# Patient Record
Sex: FEMALE | Race: WHITE | NOT HISPANIC OR LATINO | Employment: PART TIME | URBAN - METROPOLITAN AREA
[De-identification: names, ages, dates, MRNs, and addresses within clinical notes are randomized per-mention and may not be internally consistent; named-entity substitution may affect disease eponyms.]

---

## 2017-03-27 ENCOUNTER — GENERIC CONVERSION - ENCOUNTER (OUTPATIENT)
Dept: OTHER | Facility: OTHER | Age: 27
End: 2017-03-27

## 2017-03-27 DIAGNOSIS — Z86.14 HISTORY OF METHICILLIN RESISTANT STAPHYLOCOCCUS AUREUS INFECTION: ICD-10-CM

## 2017-03-28 ENCOUNTER — GENERIC CONVERSION - ENCOUNTER (OUTPATIENT)
Dept: OTHER | Facility: OTHER | Age: 27
End: 2017-03-28

## 2017-04-06 ENCOUNTER — GENERIC CONVERSION - ENCOUNTER (OUTPATIENT)
Dept: OTHER | Facility: OTHER | Age: 27
End: 2017-04-06

## 2017-08-21 ENCOUNTER — HOSPITAL ENCOUNTER (EMERGENCY)
Facility: HOSPITAL | Age: 27
Discharge: HOME | End: 2017-08-21
Attending: EMERGENCY MEDICINE
Payer: COMMERCIAL

## 2017-08-21 VITALS
DIASTOLIC BLOOD PRESSURE: 75 MMHG | OXYGEN SATURATION: 100 % | RESPIRATION RATE: 18 BRPM | BODY MASS INDEX: 22.35 KG/M2 | TEMPERATURE: 98.2 F | HEART RATE: 108 BPM | HEIGHT: 58 IN | WEIGHT: 106.48 LBS | SYSTOLIC BLOOD PRESSURE: 118 MMHG

## 2017-08-21 DIAGNOSIS — J02.9 ACUTE VIRAL PHARYNGITIS: Primary | ICD-10-CM

## 2017-08-21 PROCEDURE — 99282 EMERGENCY DEPT VISIT SF MDM: CPT

## 2017-08-21 RX ORDER — QUETIAPINE FUMARATE 50 MG/1
200 TABLET, FILM COATED ORAL
COMMUNITY
End: 2018-02-27

## 2017-08-21 RX ORDER — QUETIAPINE FUMARATE 50 MG/1
50 TABLET, FILM COATED ORAL
COMMUNITY
End: 2018-02-27
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
## 2018-01-16 NOTE — RESULT NOTES
Message  pt  scheduled for pre-demi H&P thru [facility]  Pt  refusing to have recommended services and only wants to have dating [address] she is going to be discharged from half-way and will be terminating pregnancy at [facility] later this week  Appointment cancelled  Patient encouraged to have f/u appointment for birth control options after discharge        Signatures   Electronically signed by : Maggie Chan RN; 2017 11:22AM EST                       (Author)

## 2018-01-22 VITALS
DIASTOLIC BLOOD PRESSURE: 74 MMHG | WEIGHT: 110.8 LBS | SYSTOLIC BLOOD PRESSURE: 110 MMHG | BODY MASS INDEX: 23.26 KG/M2 | HEIGHT: 58 IN

## 2018-02-27 ENCOUNTER — INITIAL PRENATAL (OUTPATIENT)
Dept: OBGYN CLINIC | Facility: CLINIC | Age: 28
End: 2018-02-27

## 2018-02-27 VITALS
DIASTOLIC BLOOD PRESSURE: 58 MMHG | WEIGHT: 96 LBS | SYSTOLIC BLOOD PRESSURE: 120 MMHG | HEIGHT: 59 IN | BODY MASS INDEX: 19.35 KG/M2

## 2018-02-27 DIAGNOSIS — Z36.9 ANTENATAL SCREENING ENCOUNTER: ICD-10-CM

## 2018-02-27 DIAGNOSIS — Z34.81 PRENATAL CARE, SUBSEQUENT PREGNANCY, FIRST TRIMESTER: Primary | ICD-10-CM

## 2018-02-27 PROCEDURE — PNV: Performed by: OBSTETRICS & GYNECOLOGY

## 2018-02-27 NOTE — PROGRESS NOTES
Initial prenatal appt:   SAB1 VIP2 - FOB involved (1st preg for him) - pt hx - ADD, bipolar (d/c Seroquel 2017)  Pt d/c depo provera 2017 - states menses have been q 2-3 wks since  Hx heroin use - not x past 16 months, smoker - was smoking 1 ppd, now 1/4 ppd since pregnant  Hx MRSA  Taking prenatal vitamins - will add DHA  Reviewed nutrition (needs to increase calcium), SQS, CF testing, TDAP @ 28 wks,  Initial prenatal labs ordered ([facility])  Undecided about breastfeeding @ present      - pt wears dentures

## 2018-03-02 ENCOUNTER — ROUTINE PRENATAL (OUTPATIENT)
Dept: OBGYN CLINIC | Facility: CLINIC | Age: 28
End: 2018-03-02
Payer: COMMERCIAL

## 2018-03-02 VITALS — BODY MASS INDEX: 19.39 KG/M2 | DIASTOLIC BLOOD PRESSURE: 70 MMHG | SYSTOLIC BLOOD PRESSURE: 114 MMHG | WEIGHT: 96 LBS

## 2018-03-02 DIAGNOSIS — Z34.81 PRENATAL CARE, SUBSEQUENT PREGNANCY, FIRST TRIMESTER: Primary | ICD-10-CM

## 2018-03-02 DIAGNOSIS — Z36.9 ANTENATAL SCREENING ENCOUNTER: ICD-10-CM

## 2018-03-02 DIAGNOSIS — O21.9 NAUSEA AND VOMITING DURING PREGNANCY: ICD-10-CM

## 2018-03-02 PROCEDURE — 87491 CHLMYD TRACH DNA AMP PROBE: CPT | Performed by: OBSTETRICS & GYNECOLOGY

## 2018-03-02 PROCEDURE — 87591 N.GONORRHOEAE DNA AMP PROB: CPT | Performed by: OBSTETRICS & GYNECOLOGY

## 2018-03-02 PROCEDURE — G0145 SCR C/V CYTO,THINLAYER,RESCR: HCPCS | Performed by: PATHOLOGY

## 2018-03-02 PROCEDURE — 99213 OFFICE O/P EST LOW 20 MIN: CPT | Performed by: OBSTETRICS & GYNECOLOGY

## 2018-03-02 PROCEDURE — 87624 HPV HI-RISK TYP POOLED RSLT: CPT | Performed by: OBSTETRICS & GYNECOLOGY

## 2018-03-02 PROCEDURE — 87070 CULTURE OTHR SPECIMN AEROBIC: CPT | Performed by: OBSTETRICS & GYNECOLOGY

## 2018-03-02 PROCEDURE — G0124 SCREEN C/V THIN LAYER BY MD: HCPCS | Performed by: PATHOLOGY

## 2018-03-02 RX ORDER — ONDANSETRON 4 MG/1
4 TABLET, FILM COATED ORAL EVERY 8 HOURS PRN
Qty: 20 TABLET | Refills: 0 | Status: SHIPPED | OUTPATIENT
Start: 2018-03-02 | End: 2018-06-16 | Stop reason: ALTCHOICE

## 2018-03-02 NOTE — PROGRESS NOTES
Transabdominal US confirms viable intrauterine pregnancy measuring 11 4/7 weeks  Will keep WILLAM of 18 from LMP  Prenatal course of care reviewed with patient  Patient advised to verify with her insurance regarding coverage of sequential screening and to schedule appt with  center within the next  She will have her initial OB labs drawn on 18  Consent for treatment signed  Pap and cultures obtained  All questions and concerns addressed  She is to return in 4 weeks

## 2018-03-04 LAB
BACTERIA GENITAL AEROBE CULT: NORMAL
CHLAMYDIA DNA CVX QL NAA+PROBE: NORMAL
N GONORRHOEA DNA GENITAL QL NAA+PROBE: NORMAL

## 2018-03-05 ENCOUNTER — TELEPHONE (OUTPATIENT)
Dept: OBGYN CLINIC | Facility: CLINIC | Age: 28
End: 2018-03-05

## 2018-03-05 NOTE — TELEPHONE ENCOUNTER
Pt noticed red spotting in toilet this am x 1, none since, no cramping,  Had intercourse last night  Will recall in aft to update

## 2018-03-16 LAB — HPV RRNA GENITAL QL NAA+PROBE: NORMAL

## 2018-03-22 LAB
LAB AP GYN PRIMARY INTERPRETATION: NORMAL
LAB AP LMP: NORMAL
Lab: NORMAL
PATH INTERP SPEC-IMP: NORMAL

## 2018-04-13 ENCOUNTER — TELEPHONE (OUTPATIENT)
Dept: OBGYN CLINIC | Facility: CLINIC | Age: 28
End: 2018-04-13

## 2018-04-19 ENCOUNTER — TELEPHONE (OUTPATIENT)
Dept: OBGYN CLINIC | Facility: CLINIC | Age: 28
End: 2018-04-19

## 2018-04-19 ENCOUNTER — ROUTINE PRENATAL (OUTPATIENT)
Dept: OBGYN CLINIC | Facility: CLINIC | Age: 28
End: 2018-04-19

## 2018-04-19 VITALS — SYSTOLIC BLOOD PRESSURE: 122 MMHG | BODY MASS INDEX: 20.2 KG/M2 | WEIGHT: 100 LBS | DIASTOLIC BLOOD PRESSURE: 80 MMHG

## 2018-04-19 DIAGNOSIS — Z34.82 PRENATAL CARE, SUBSEQUENT PREGNANCY, SECOND TRIMESTER: Primary | ICD-10-CM

## 2018-04-19 PROCEDURE — PNV: Performed by: NURSE PRACTITIONER

## 2018-04-19 RX ORDER — AZITHROMYCIN 250 MG/1
TABLET, FILM COATED ORAL
Refills: 0 | COMMUNITY
Start: 2018-04-16 | End: 2018-06-16 | Stop reason: ALTCHOICE

## 2018-04-19 RX ORDER — METHYLPREDNISOLONE 4 MG/1
TABLET ORAL
Refills: 0 | COMMUNITY
Start: 2018-04-16 | End: 2018-06-16 | Stop reason: ALTCHOICE

## 2018-04-19 RX ORDER — LORATADINE 10 MG/1
10 TABLET ORAL DAILY
Refills: 0 | COMMUNITY
Start: 2018-04-16 | End: 2018-06-16 | Stop reason: ALTCHOICE

## 2018-04-19 NOTE — PROGRESS NOTES
Patient is doing well  Denies LOF/Bleeding/Cramping  FHR obtained by Us  Patient is going to have labs drawn today  Quad Screening was ordered  She is still smoking 3 cigarettes/day  Currently has acute bronchitis  She was reminded to schedule her level 2 ultrasound

## 2018-05-15 ENCOUNTER — TRANSCRIBE ORDERS (OUTPATIENT)
Dept: LAB | Facility: HOSPITAL | Age: 28
End: 2018-05-15

## 2018-05-15 ENCOUNTER — TELEPHONE (OUTPATIENT)
Dept: OBGYN CLINIC | Facility: CLINIC | Age: 28
End: 2018-05-15

## 2018-05-15 ENCOUNTER — ROUTINE PRENATAL (OUTPATIENT)
Dept: OBGYN CLINIC | Facility: CLINIC | Age: 28
End: 2018-05-15
Payer: COMMERCIAL

## 2018-05-15 ENCOUNTER — APPOINTMENT (OUTPATIENT)
Dept: LAB | Facility: HOSPITAL | Age: 28
End: 2018-05-15
Attending: OBSTETRICS & GYNECOLOGY
Payer: COMMERCIAL

## 2018-05-15 VITALS — BODY MASS INDEX: 20.64 KG/M2 | DIASTOLIC BLOOD PRESSURE: 64 MMHG | WEIGHT: 102.2 LBS | SYSTOLIC BLOOD PRESSURE: 100 MMHG

## 2018-05-15 DIAGNOSIS — Z36.9 ANTENATAL SCREENING ENCOUNTER: ICD-10-CM

## 2018-05-15 DIAGNOSIS — F11.10 HEROIN ABUSE AFFECTING PREGNANCY IN SECOND TRIMESTER (HCC): ICD-10-CM

## 2018-05-15 DIAGNOSIS — Z34.82 PRENATAL CARE, SUBSEQUENT PREGNANCY, SECOND TRIMESTER: Primary | ICD-10-CM

## 2018-05-15 DIAGNOSIS — O99.322 HEROIN ABUSE AFFECTING PREGNANCY IN SECOND TRIMESTER (HCC): ICD-10-CM

## 2018-05-15 PROBLEM — Z87.820 HISTORY OF CONCUSSION: Status: ACTIVE | Noted: 2017-11-06

## 2018-05-15 PROBLEM — F32.A ANXIETY AND DEPRESSION: Status: ACTIVE | Noted: 2018-05-15

## 2018-05-15 PROBLEM — F31.30 BIPOLAR AFFECT, DEPRESSED (HCC): Status: ACTIVE | Noted: 2018-05-15

## 2018-05-15 PROBLEM — F41.9 ANXIETY AND DEPRESSION: Status: ACTIVE | Noted: 2018-05-15

## 2018-05-15 PROBLEM — F19.90 IVDU (INTRAVENOUS DRUG USER): Status: ACTIVE | Noted: 2017-10-17

## 2018-05-15 PROBLEM — Z72.0 TOBACCO ABUSE: Status: ACTIVE | Noted: 2018-05-15

## 2018-05-15 PROBLEM — J45.909 ASTHMA: Status: ACTIVE | Noted: 2017-03-27

## 2018-05-15 PROBLEM — J45.990 ASTHMA, EXERCISE INDUCED: Status: ACTIVE | Noted: 2018-05-15

## 2018-05-15 LAB
ABO GROUP BLD: NORMAL
BACTERIA UR QL AUTO: ABNORMAL /HPF
BASOPHILS # BLD AUTO: 0.03 THOUSANDS/ΜL (ref 0–0.1)
BASOPHILS NFR BLD AUTO: 0 % (ref 0–1)
BILIRUB UR QL STRIP: NEGATIVE
BLD GP AB SCN SERPL QL: NEGATIVE
CLARITY UR: CLEAR
COLOR UR: ABNORMAL
EOSINOPHIL # BLD AUTO: 0.72 THOUSAND/ΜL (ref 0–0.61)
EOSINOPHIL NFR BLD AUTO: 6 % (ref 0–6)
ERYTHROCYTE [DISTWIDTH] IN BLOOD BY AUTOMATED COUNT: 13.1 % (ref 11.6–15.1)
EXTERNAL HIV-1 ANTIBODY: NORMAL
GLUCOSE UR STRIP-MCNC: NEGATIVE MG/DL
HCT VFR BLD AUTO: 35.2 % (ref 34.8–46.1)
HGB BLD-MCNC: 11.5 G/DL (ref 11.5–15.4)
HGB UR QL STRIP.AUTO: NEGATIVE
HYALINE CASTS #/AREA URNS LPF: ABNORMAL /LPF
IMM GRANULOCYTES # BLD AUTO: 0.05 THOUSAND/UL (ref 0–0.2)
IMM GRANULOCYTES NFR BLD AUTO: 0 % (ref 0–2)
KETONES UR STRIP-MCNC: NEGATIVE MG/DL
LEUKOCYTE ESTERASE UR QL STRIP: ABNORMAL
LYMPHOCYTES # BLD AUTO: 2.65 THOUSANDS/ΜL (ref 0.6–4.47)
LYMPHOCYTES NFR BLD AUTO: 23 % (ref 14–44)
MCH RBC QN AUTO: 29.6 PG (ref 26.8–34.3)
MCHC RBC AUTO-ENTMCNC: 32.7 G/DL (ref 31.4–37.4)
MCV RBC AUTO: 91 FL (ref 82–98)
MONOCYTES # BLD AUTO: 0.66 THOUSAND/ΜL (ref 0.17–1.22)
MONOCYTES NFR BLD AUTO: 6 % (ref 4–12)
NEUTROPHILS # BLD AUTO: 7.41 THOUSANDS/ΜL (ref 1.85–7.62)
NEUTS SEG NFR BLD AUTO: 64 % (ref 43–75)
NITRITE UR QL STRIP: NEGATIVE
NON-SQ EPI CELLS URNS QL MICRO: ABNORMAL /HPF
NRBC BLD AUTO-RTO: 0 /100 WBCS
PH UR STRIP.AUTO: 8.5 [PH] (ref 4.5–8)
PLATELET # BLD AUTO: 296 THOUSANDS/UL (ref 149–390)
PMV BLD AUTO: 10.9 FL (ref 8.9–12.7)
PROT UR STRIP-MCNC: ABNORMAL MG/DL
RBC # BLD AUTO: 3.89 MILLION/UL (ref 3.81–5.12)
RBC #/AREA URNS AUTO: ABNORMAL /HPF
RH BLD: POSITIVE
RUBV IGG SERPL IA-ACNC: 11.9 IU/ML
SP GR UR STRIP.AUTO: 1.02 (ref 1–1.03)
SPECIMEN EXPIRATION DATE: NORMAL
UROBILINOGEN UR QL STRIP.AUTO: 1 E.U./DL
WBC # BLD AUTO: 11.52 THOUSAND/UL (ref 4.31–10.16)
WBC #/AREA URNS AUTO: ABNORMAL /HPF

## 2018-05-15 PROCEDURE — 87147 CULTURE TYPE IMMUNOLOGIC: CPT

## 2018-05-15 PROCEDURE — 36415 COLL VENOUS BLD VENIPUNCTURE: CPT

## 2018-05-15 PROCEDURE — 99213 OFFICE O/P EST LOW 20 MIN: CPT | Performed by: OBSTETRICS & GYNECOLOGY

## 2018-05-15 PROCEDURE — 87086 URINE CULTURE/COLONY COUNT: CPT

## 2018-05-15 PROCEDURE — 81001 URINALYSIS AUTO W/SCOPE: CPT

## 2018-05-15 PROCEDURE — 80081 OBSTETRIC PANEL INC HIV TSTG: CPT

## 2018-05-15 NOTE — PROGRESS NOTES
Denies LOF/Bleeding/Cramping  Baby is moving well  She hasn't had her initial labs drawn and has not scheduled her level II ultrasound  She is accompanied by her mother today  Patient is very tearful, states she relapsed on heroin and has been using twice a day for the past 2 months  States she wants help and needs guidance to an outpatient clinic that can place her Subutex  We discussed with the patient transferring her care to the [address] [place] [address] as they have access to a  on site who can assist her in getting into an outpatient program  Our manager will coordinate her transfer, we will give her call by tomorrow with an update  In the meantime she has been encouraged to get her labs drawn and schedule her level II US with in the  center  Patient's mother will ensure that patient has labs drawn today and schedules her level II US

## 2018-05-16 ENCOUNTER — TELEPHONE (OUTPATIENT)
Dept: OBGYN CLINIC | Facility: CLINIC | Age: 28
End: 2018-05-16

## 2018-05-16 LAB
BACTERIA UR CULT: ABNORMAL
BACTERIA UR CULT: ABNORMAL
HBV SURFACE AG SER QL: NORMAL
HIV 1+2 AB+HIV1 P24 AG SERPL QL IA: NORMAL
RPR SER QL: NORMAL

## 2018-05-16 NOTE — TELEPHONE ENCOUNTER
Spoke with Tomasa Grey patient's mother regarding treatment options for patient  I have located 2 facilities that will take patient and start her on methadone  Patient does not want to start on methadone as she has been on this treatment in the past and does not like the way it makes her feel  She prefers to be placed on Subutex or Suboxone  Explained to mother in order for patient to get treatment with Subutex or Suboxone she would need to transfer her care to either Dr Juan Stover or Dr Myriam Schwartz of [facility]  Mother provided with name and numbers for each location and provider  She will keep us up to date as to their decision

## 2018-05-21 ENCOUNTER — TELEPHONE (OUTPATIENT)
Dept: OBGYN CLINIC | Facility: CLINIC | Age: 28
End: 2018-05-21

## 2018-05-21 NOTE — TELEPHONE ENCOUNTER
Call received from patient's mother Elana to inform us that Maxine has an appt with Dr Tyree Knapp OB/GYN today at [facility] where she will get help for her heroin abuse

## 2018-05-23 ENCOUNTER — ROUTINE PRENATAL (OUTPATIENT)
Dept: PERINATAL CARE | Facility: CLINIC | Age: 28
End: 2018-05-23
Payer: COMMERCIAL

## 2018-05-23 VITALS
HEART RATE: 84 BPM | BODY MASS INDEX: 20.64 KG/M2 | WEIGHT: 102.4 LBS | HEIGHT: 59 IN | DIASTOLIC BLOOD PRESSURE: 85 MMHG | SYSTOLIC BLOOD PRESSURE: 117 MMHG

## 2018-05-23 DIAGNOSIS — Z3A.23 23 WEEKS GESTATION OF PREGNANCY: ICD-10-CM

## 2018-05-23 DIAGNOSIS — O99.322 HIGH RISK PREGNANCY DUE TO MATERNAL DRUG ABUSE IN SECOND TRIMESTER (HCC): Primary | ICD-10-CM

## 2018-05-23 DIAGNOSIS — O99.342 BIPOLAR DISEASE IN PREGNANCY IN SECOND TRIMESTER (HCC): ICD-10-CM

## 2018-05-23 DIAGNOSIS — F19.10 HIGH RISK PREGNANCY DUE TO MATERNAL DRUG ABUSE IN SECOND TRIMESTER (HCC): Primary | ICD-10-CM

## 2018-05-23 DIAGNOSIS — F31.9 BIPOLAR DISEASE IN PREGNANCY IN SECOND TRIMESTER (HCC): ICD-10-CM

## 2018-05-23 DIAGNOSIS — Z36.86 ENCOUNTER FOR ANTENATAL SCREENING FOR CERVICAL LENGTH: ICD-10-CM

## 2018-05-23 DIAGNOSIS — Z36.3 ENCOUNTER FOR ANTENATAL SCREENING FOR MALFORMATIONS: ICD-10-CM

## 2018-05-23 PROCEDURE — 76817 TRANSVAGINAL US OBSTETRIC: CPT | Performed by: OBSTETRICS & GYNECOLOGY

## 2018-05-23 PROCEDURE — 76805 OB US >/= 14 WKS SNGL FETUS: CPT | Performed by: OBSTETRICS & GYNECOLOGY

## 2018-05-23 PROCEDURE — 99242 OFF/OP CONSLTJ NEW/EST SF 20: CPT | Performed by: OBSTETRICS & GYNECOLOGY

## 2018-05-23 NOTE — PROGRESS NOTES
Please refer to the [facility] ultrasound report in Ob Procedures for additional information regarding the visit to the [facility]  today

## 2018-05-23 NOTE — LETTER
May 23, 2018     Jeff Sullivan MD  [address] [facility]  [address] [place] [address]    Patient: Opal Good   YOB: 1990   Date of Visit: 5/23/2018       Dear Dr Carol Peralta: Thank you for referring Opal Good to me for evaluation  Below are my notes for this consultation  If you have questions, please do not hesitate to call me  I look forward to following your patient along with you  Sincerely,        Alicja Morfin MD        CC: No Recipients  Alicja Morfin MD  5/23/2018  1:54 PM  Sign at close encounter  Please refer to the [facility] ultrasound report in Ob Procedures for additional information regarding the visit to the [facility]  today

## 2018-05-23 NOTE — PROGRESS NOTES
A transvaginal ultrasound was performed  Sonographer note on use of High Level Disinfection Process (Trophon) for transvaginal probe# 3 used, serial [id number]    [address] Noreen Andres

## 2018-05-24 ENCOUNTER — CONSULT (OUTPATIENT)
Dept: PERINATAL CARE | Facility: CLINIC | Age: 28
End: 2018-05-24
Payer: COMMERCIAL

## 2018-05-24 VITALS
SYSTOLIC BLOOD PRESSURE: 107 MMHG | WEIGHT: 102.2 LBS | BODY MASS INDEX: 21.45 KG/M2 | HEIGHT: 58 IN | HEART RATE: 114 BPM | DIASTOLIC BLOOD PRESSURE: 67 MMHG

## 2018-05-24 DIAGNOSIS — F11.20 OPIOID TYPE DEPENDENCE, CONTINUOUS (HCC): ICD-10-CM

## 2018-05-24 DIAGNOSIS — O99.322 HIGH RISK PREGNANCY DUE TO MATERNAL DRUG ABUSE IN SECOND TRIMESTER (HCC): Primary | ICD-10-CM

## 2018-05-24 DIAGNOSIS — F19.10 HIGH RISK PREGNANCY DUE TO MATERNAL DRUG ABUSE IN SECOND TRIMESTER (HCC): Primary | ICD-10-CM

## 2018-05-24 DIAGNOSIS — Z3A.23 23 WEEKS GESTATION OF PREGNANCY: ICD-10-CM

## 2018-05-24 PROCEDURE — 99214 OFFICE O/P EST MOD 30 MIN: CPT | Performed by: OBSTETRICS & GYNECOLOGY

## 2018-05-24 NOTE — Clinical Note
Hi, got this patient started last night of suboxone  I will touch base with her today by phone  She is due to come back to the office on Tuesday  Thanks again   joanne

## 2018-05-24 NOTE — Clinical Note
George Trevizo, this is the patient I started on suboxone last night and will need to fill out prior authorization for her  I was wondering if you could call her today to let her know she needs labs done? I ordered them this morning (forgot to do it last night)  Thank you!  Selena Pittman

## 2018-05-25 ENCOUNTER — TELEPHONE (OUTPATIENT)
Dept: PERINATAL CARE | Facility: CLINIC | Age: 28
End: 2018-05-25

## 2018-05-25 ENCOUNTER — DOCUMENTATION (OUTPATIENT)
Dept: PERINATAL CARE | Facility: CLINIC | Age: 28
End: 2018-05-25

## 2018-05-25 NOTE — PROGRESS NOTES
JOSE Attending Lauren Santiago): Met with patient today to discuss use of heroin and initiation of medication maintenance therapy  Please see Dr Gerardo Ramos consultation from yesterday under ultrasound cover  She last used heroin this morning  Current use is 4 bags daily (~10-12 bags typically makes up a bundle)  She uses it nasally  She was in sobriety November 2016-January 2018  Previously (remotely) she used to inject  She has been trying to cut down on her own but has not been successful  Previously she tried methadone pills from a contact  In 2009 she had been on suboxone but did not continue this long term  She had been on methadone 9429-2245 and in [id number]  Max methadone dose was 110mg and she weaned down to 20mg  She has never overdosed or required narcan  Exam:  Vitals: Blood pressure 107/67, pulse (!) 114, height 4' 10" (1 473 m), weight 46 4 kg (102 lb 3 2 oz), last menstrual period 12/12/2017  General appearance: oriented to person, place, and time, normal appearing weight, anxious and in mild to moderate distress  The remainder of her physical examination was deferred as she was here today for consultation and discussion  Today we reviewed the rationale behind medication maintence therapy in pregnancy  I explained that detoxification is associated with high rate of relapse  I explained that relapse is associated with adverse events including HIV and HCV as well as overdose  Explained risks and benefits of medication maintenance therapy  I offered initiation of suboxone (buprenorphine/naloxone) 4mg  She strongly desires help  She is not yet in withdrawal   Discussed induction and that she begin the medication when she gets in mild-to-moderate withdrawal   Explained that addiction is best treated with combination of counseling and medication and that once we get her started on medication that next step is counseling and therapy    We are heading into a holiday weekend and it is very difficult for her to get to our office  Tomorrow I am at [facility] office and Dr Blanca Walker is up in [employer] and this patient can get to neither  [facility] was only able to see her but in a week  Inpatient induction is not an option for her secondary to work  So, plan for now is induction tonight once she begins experiencing withdrawal   7 day supply given with the expectation that she may need more than 4mg in a 24 hour period  I will call her in the morning  Discussed uptitration (max 8mg in first 24h period, max 16mg in second 24h period)  If she only needs 4mg a day then this will last her (tonight, Friday night, Saturday night,  night, Monday night, Tuesday night and Wednesday night) however if she needs more than 4mg then this will obviously last her shorter time  Given holiday weekend she will come in Tuesday to meet with me again  If any problems she can call  Copy of Rx attached below  Discussed prior authorization process and also expected cost of medication if no coverage exists  She will need labs ASAP for LFTs, HIV, HCV, and HBsAb  Future Appointments  Date Time Provider aMday Rosales   2018 3:00 PM  US 3 [address] [place] [address]   2018 10:00 AM  US 2 [facility]     A total of 30 minutes were spent in this encounter with >50% of the time spent in face-to-face counseling and in coordination of care  At the conclusion of today's encounter, all questions were answered to her satisfaction  Thank you very much for this kind referral and please do not hesitate to contact me with any further questions or concerns      Sincerely,    José Miguel Monroy MD  Maternal-Fetal Medicine

## 2018-05-25 NOTE — PROGRESS NOTES
Per June Guerra @ [address] Provider service [phone] no prior authorization needed for Suboxone 2 tabs daily  Subutex 4mg  Approved  thru 11/24/18     If pt requires Film - will need new prior auth

## 2018-05-25 NOTE — TELEPHONE ENCOUNTER
MFM Attending Sam Mcgregor): called pt to check in to see how the induction on Suboxone has started  No answer so called her emergency contact, left message, which she promptly returned to [place] and I called her back from [facility] where I am on site today  Her mother Khurram Rodriguez (who accompanied her to the consult with Dr Lj Sam (at [phone]) states that the pt hadn't yet begun the Suboxone as she had originally expected Subutex  PDM query supports that she has not yet filled the Rx  Explained rationale behind this medication  Explained that ideally induction is done in the office setting which the patient cannot do apparently  Other options beyond office induction is home induction and inpatient induction  Reviewed the rationale behind yesterday's prescription which was not provided with the expectation that she would stabilize on 4mg but rather that the supply given would permit uptitration  Reviewed again the possibility of precipitated withdrawal   Explained that care is ideally given comprehensively with counseling and other support measures  Reiterated that hospital is open 24/7 should she not be able to begin the induction or should she find the symptoms intolerable, or with any concerns or problems  Nini plans to be with her over the weekend and next Tuesday for her apointment  Khurram Rodriguez voices understanding of the limitations of home therapy, my recommendations to come in with any issues, and that we are trying to work within the limitations placed by the patient as well as her request to start treatment immediately      Sincerely,    Parul Alberto MD  Maternal-Fetal Medicine

## 2018-05-30 ENCOUNTER — TELEPHONE (OUTPATIENT)
Dept: PERINATAL CARE | Facility: CLINIC | Age: 28
End: 2018-05-30

## 2018-05-31 ENCOUNTER — CONSULT (OUTPATIENT)
Dept: PERINATAL CARE | Facility: CLINIC | Age: 28
End: 2018-05-31
Payer: COMMERCIAL

## 2018-05-31 ENCOUNTER — TELEPHONE (OUTPATIENT)
Dept: PERINATAL CARE | Facility: CLINIC | Age: 28
End: 2018-05-31

## 2018-05-31 VITALS
HEIGHT: 58 IN | WEIGHT: 104.8 LBS | SYSTOLIC BLOOD PRESSURE: 96 MMHG | HEART RATE: 97 BPM | DIASTOLIC BLOOD PRESSURE: 61 MMHG | BODY MASS INDEX: 22 KG/M2

## 2018-05-31 DIAGNOSIS — Z3A.24 24 WEEKS GESTATION OF PREGNANCY: ICD-10-CM

## 2018-05-31 DIAGNOSIS — F11.20 OPIOID TYPE DEPENDENCE, CONTINUOUS (HCC): ICD-10-CM

## 2018-05-31 DIAGNOSIS — F19.10 HIGH RISK PREGNANCY DUE TO MATERNAL DRUG ABUSE IN SECOND TRIMESTER (HCC): Primary | ICD-10-CM

## 2018-05-31 DIAGNOSIS — O99.322 HIGH RISK PREGNANCY DUE TO MATERNAL DRUG ABUSE IN SECOND TRIMESTER (HCC): Primary | ICD-10-CM

## 2018-05-31 PROCEDURE — 99214 OFFICE O/P EST MOD 30 MIN: CPT | Performed by: OBSTETRICS & GYNECOLOGY

## 2018-05-31 NOTE — TELEPHONE ENCOUNTER
Asked by RN to [facility]   [facility]day supply as this would be 3 5 of the 8mg suboxone films; rather they could give 3 films (=6 day supply) or 4 films (=8 day supply)  Reviewed this with patient by phone  She is planning to start tonight  Since she has not yet started her medication and has no further questions, OK to reschedule her appointment to tomorrow  She works 11a-5p and would like to come in before that    Librado Luu MD

## 2018-05-31 NOTE — PROGRESS NOTES
JOSE Attending Mika Mitchell): Met with the patient this morning  She has been doing well  Today is Thursday  She last use heroin on Sunday, approximately 1 5 bags  About 6 hours after her last use of heroin was when she began the Suboxone  She was unable to  the prescription I hadgiven her so she bought some from a friend and is fairly certain that it was Suboxone and not Subutex  On Sunday she took 4 mg of Suboxone and tolerated that well  On Monday she took 4 mg in the morning and 4 mg in the evening  On Tuesday she did the same  These cost her approximately $10 per day  Wednesday (yesterday), she was finally able to  the prescription, and took her 1st 4 mg dose at approximately 1300  Today is Thursday  She took her 1st dose this morning at 3:30 a m  Maribel Pérez She feels well about starting  She has no cravings and also feels no physical need for heroin  She did not experience precipitated withdrawal   The prescription that she picked up last night at 8:30 p m  dispensed her 4 days worth of a 4 mg supply therefore has 3 of the 4 mg doses left (=12mg total)  This should cover her for this evening and through tomorrow  I reviewed the coverage note from Nafisa Squires in her chart regarding prior authorization  She is fine with doing the tabs as opposed to the films  So, I gave her a prescription, see attached, for total of 8 mg daily for the next 7 days  This should cover her for: Saturday, Sunday, Monday, Tuesday, Wednesday, Thursday, Friday  I would like her to come back Friday 6/8/18  We will set up an appoint with Dr Viviana Patel as I am at the [facility] office on Friday  I gave her lab slips to get these done  She would like to meet with neonatology to discuss planning surrounding delivery  She would very much like to be able to take her baby home with her from the hospital   We discussed that this is a complex discussion and that we would support her as much as possible    I congratulated her on her steps towards recovery I also reminded her that comprehensive addiction care involves counseling  We discussed the option of Dr Becki Gonzalez going forward  A total of 30 minutes were spent in this encounter with >50% of the time spent in face-to-face counseling and in coordination of care  At the conclusion of today's encounter, all questions were answered to her satisfaction  Thank you very much for this kind referral and please do not hesitate to contact me with any further questions or concerns        Sincerely,    Kimberley Stanley MD  Maternal-Fetal Medicine

## 2018-05-31 NOTE — PATIENT INSTRUCTIONS
Please return next Friday to meet with Dr Beck Underwood  We will coordinate your NICU consultation

## 2018-05-31 NOTE — TELEPHONE ENCOUNTER
Left message on voicemail that she is scheduled for a NICU consult for LATOSHA on Friday, 6/8 at 3:30 following her ultrasound at 2:30  I asked her to call back confirming that she got the message    Email sent to Dr Jessie George

## 2018-06-07 ENCOUNTER — OB ABSTRACT (OUTPATIENT)
Dept: PERINATAL CARE | Facility: CLINIC | Age: 28
End: 2018-06-07

## 2018-06-07 NOTE — PROGRESS NOTES
Got a call from the patient's pharmacy stating that prior authorization was needed if they were to fill her prescription as I wrote previously on May 31st   I reviewed documentation from my last note on the patient as well as the documentation from our nurse who spoke to the patient's insurance company regarding prior authorization  The pharmacist indicated that he could dispense 8 mg tabs to the patient with no prior authorization required  She had indicated to me at our last appointment that she could take either  Therefore, over the phone, we (me and the pharmacist Di Garcia) updated the prescription to reflect a 7 day supply  This would be dispensed today  PDMP should be accessed again at the patient's next visit   Porsha Aguilar MD

## 2018-06-14 ENCOUNTER — ULTRASOUND (OUTPATIENT)
Dept: PERINATAL CARE | Facility: CLINIC | Age: 28
End: 2018-06-14
Payer: COMMERCIAL

## 2018-06-14 VITALS
HEIGHT: 59 IN | BODY MASS INDEX: 20.56 KG/M2 | DIASTOLIC BLOOD PRESSURE: 49 MMHG | SYSTOLIC BLOOD PRESSURE: 89 MMHG | HEART RATE: 96 BPM | WEIGHT: 102 LBS

## 2018-06-14 DIAGNOSIS — O99.322 HIGH RISK PREGNANCY DUE TO MATERNAL DRUG ABUSE IN SECOND TRIMESTER (HCC): Primary | ICD-10-CM

## 2018-06-14 DIAGNOSIS — Z3A.26 26 WEEKS GESTATION OF PREGNANCY: ICD-10-CM

## 2018-06-14 DIAGNOSIS — F19.10 HIGH RISK PREGNANCY DUE TO MATERNAL DRUG ABUSE IN SECOND TRIMESTER (HCC): Primary | ICD-10-CM

## 2018-06-14 PROCEDURE — 76816 OB US FOLLOW-UP PER FETUS: CPT | Performed by: OBSTETRICS & GYNECOLOGY

## 2018-06-14 PROCEDURE — 99214 OFFICE O/P EST MOD 30 MIN: CPT | Performed by: OBSTETRICS & GYNECOLOGY

## 2018-06-16 NOTE — PROGRESS NOTES
Problem list:    1  Chronic IV heroin abuse  Patient also admits to illicit methadone use  2  Tobacco use    Reviewed her past note by Dr Annette Duvall  Patient seemed to be doing well on suboxone without signs of withdraw  She did not see me or the NICU on 06/08 as planned  Hence she did not have enough suboxone to last 2 weeks  At her visit today she tells me that she is no longer on Suboxone and that during her initiation of Suboxone she had taken a dose of heroin the night before starting Suboxone as well as illicit methadone  The next morning upon starting Suboxone she reports feeling nausea/ vomiting and just felt like crap  She reports she is not sure if this was a sign of withdrawal that can happen when Suboxone is started without being in moderate to severe withdraw  She resumed her heroin and took her last dose this morning  Hence during her visit today her mood was erratic  Her story of her time line of starting Suboxone was difficult to follow but I suspect she originally did well with Dr Annette Duvall but then stopped her suboxone  When she did restart her Suboxone, it was too soon after her last dose of heroine and methadone and that she was not in moderate to severe withdrawal prior to starting her medication so her medication precipitated a worse withdrawal and likely did make her feel worse  I offered inpatient induction of Suboxone which she declined  She also states that due to her job she does not have scheduled time to be off while she restarts suboxone and does not want to go to work feeling bad like before  She is very concerned about children and youth taking her baby if she continues to take illicit drugs  At this point she is considering going to a methadone clinic where she can also receive counseling  She was informed that both patients on methadone and patients on suboxone are supposed to be in counseling  At the methadone clinic the counseling occurs in the same building   With suboxone treatment she would get her counseling through [facility] ( [address])  She did not make it to her NICU consult which will need to be rescheduled  I encouraged her to get set up with a methadone clinic asap if this is her preference  She will need to give us the contact info for the program she chooses  Recommend a follow up growth scan in 4 weeks  If she is still using illicit drugs she will need to start twice weekly nst and weekly cordell from 32 weeks on and will need growth scans every 4 weeks  She needs to resume ob visits  She was referred to [facility] by Dr Krystina Eid  Will have the clinic call her with an ob visit and visit with social work and [facility] [facility] will arrange another NICU consult       Cesar Kimball MD

## 2018-06-19 ENCOUNTER — TELEPHONE (OUTPATIENT)
Dept: OBGYN CLINIC | Facility: HOSPITAL | Age: 28
End: 2018-06-19

## 2018-06-19 NOTE — TELEPHONE ENCOUNTER
Voicemail left for pt to call our office to discuss plan of care going forward  Office number provided, also to ask for Tita Zamora RN

## 2018-06-21 ENCOUNTER — TELEPHONE (OUTPATIENT)
Dept: OBGYN CLINIC | Facility: HOSPITAL | Age: 28
End: 2018-06-21

## 2018-07-06 ENCOUNTER — HOSPITAL ENCOUNTER (EMERGENCY)
Facility: HOSPITAL | Age: 28
Discharge: HOME | End: 2018-07-06
Attending: EMERGENCY MEDICINE
Payer: COMMERCIAL

## 2018-07-06 VITALS
RESPIRATION RATE: 20 BRPM | SYSTOLIC BLOOD PRESSURE: 119 MMHG | DIASTOLIC BLOOD PRESSURE: 55 MMHG | HEART RATE: 99 BPM | TEMPERATURE: 98.3 F | OXYGEN SATURATION: 97 % | WEIGHT: 103.17 LBS | BODY MASS INDEX: 20.84 KG/M2

## 2018-07-06 DIAGNOSIS — O23.40 URINARY TRACT INFECTION AFFECTING PREGNANCY, ANTEPARTUM: ICD-10-CM

## 2018-07-06 DIAGNOSIS — B37.3 VULVOVAGINAL CANDIDIASIS: Primary | ICD-10-CM

## 2018-07-06 DIAGNOSIS — O46.92 VAGINAL BLEEDING IN PREGNANCY, SECOND TRIMESTER: ICD-10-CM

## 2018-07-06 LAB
BACTERIA UR QL AUTO: ABNORMAL /HPF
BILIRUB UR QL STRIP: ABNORMAL
BILIRUB UR QL STRIP: ABNORMAL
CLARITY UR: ABNORMAL
CLARITY UR: CLEAR
COLOR UR: YELLOW
COLOR UR: YELLOW
GLUCOSE UR STRIP-MCNC: NEGATIVE MG/DL
GLUCOSE UR STRIP-MCNC: NEGATIVE MG/DL
HGB UR QL STRIP.AUTO: ABNORMAL
HGB UR QL STRIP.AUTO: ABNORMAL
KETONES UR STRIP-MCNC: NEGATIVE MG/DL
KETONES UR STRIP-MCNC: NEGATIVE MG/DL
LEUKOCYTE ESTERASE UR QL STRIP: ABNORMAL
LEUKOCYTE ESTERASE UR QL STRIP: ABNORMAL
MUCOUS THREADS UR QL AUTO: ABNORMAL
NITRITE UR QL STRIP: NEGATIVE
NITRITE UR QL STRIP: NEGATIVE
NON-SQ EPI CELLS URNS QL MICRO: ABNORMAL /HPF
OTHER STN SPEC: ABNORMAL
PH UR STRIP.AUTO: 6 [PH] (ref 4.5–8)
PH UR STRIP.AUTO: 6 [PH] (ref 4.5–8)
PROT UR STRIP-MCNC: ABNORMAL MG/DL
PROT UR STRIP-MCNC: ABNORMAL MG/DL
RBC #/AREA URNS AUTO: ABNORMAL /HPF
SP GR UR STRIP.AUTO: >=1.03 (ref 1–1.03)
SP GR UR STRIP.AUTO: >=1.03 (ref 1–1.03)
UROBILINOGEN UR QL STRIP.AUTO: 1 E.U./DL
UROBILINOGEN UR QL STRIP.AUTO: 1 E.U./DL
WBC #/AREA URNS AUTO: ABNORMAL /HPF

## 2018-07-06 PROCEDURE — 99284 EMERGENCY DEPT VISIT MOD MDM: CPT

## 2018-07-06 PROCEDURE — 87086 URINE CULTURE/COLONY COUNT: CPT | Performed by: EMERGENCY MEDICINE

## 2018-07-06 PROCEDURE — 81001 URINALYSIS AUTO W/SCOPE: CPT | Performed by: EMERGENCY MEDICINE

## 2018-07-06 RX ORDER — CLOTRIMAZOLE 1 %
1 CREAM WITH APPLICATOR VAGINAL
Qty: 45 G | Refills: 0 | Status: SHIPPED | OUTPATIENT
Start: 2018-07-06 | End: 2018-07-13

## 2018-07-06 RX ORDER — CEPHALEXIN 250 MG/1
500 CAPSULE ORAL EVERY 12 HOURS SCHEDULED
Qty: 14 CAPSULE | Refills: 0 | Status: SHIPPED | OUTPATIENT
Start: 2018-07-06 | End: 2018-07-13

## 2018-07-06 NOTE — ED PROVIDER NOTES
History  Chief Complaint   Patient presents with    Vaginal Bleeding - Pregnant     per pt "she has been bleeding since she left work yesterday, pt states when she wiped initially it was a light pink color, but when she got to the ER after urinating and wiping it was a red color, pt states she might had a yeast infection "     51-year-old female presents to the emergency department for evaluation of vaginal discharge and bleeding  Patient is currently 30 weeks and 5 days pregnant based on a due date of 2018  Patient states when she left work tonight she urinated and noticed that when she wiped there was a pink tinge to the tissue paper  When urinating in the emergency department she noticed a few drops of blood  Patient is unsure if she is having vaginal bleeding or bleeding in the urine  She states that the past 1 month she felt as if she had a yeast infection  She put topical medication in the vaginal area with some improvement in the symptoms however she continues to have vulvar irritation  Patient has been intermittently going to the  Center for visits  She continues to use heroin has been prescribed suboxone and has a high risk pregnancy due to her continued opioid abuse  History provided by:  Patient and significant other  History limited by:  Psychiatric disorder  Vaginal Bleeding   Quality:  Unable to specify  Severity:  Mild  Onset quality:  Gradual  Timing:  Unable to specify  Progression:  Unchanged  Chronicity:  New  Possible pregnancy: pt  pregnant  Context: spontaneously    Relieved by:  Nothing  Worsened by:  Nothing  Associated symptoms: vaginal discharge    Associated symptoms: no abdominal pain, no back pain and no dysuria        Prior to Admission Medications   Prescriptions Last Dose Informant Patient Reported? Taking?    ALBUTEROL IN  Self Yes Yes   Sig: Inhale as needed   METHADONE HCL PO   Yes Yes   Sig: Take 30 mg by mouth   Prenatal Vit-Fe Fumarate-FA (PRENATAL VITAMIN PO)  Self Yes Yes   Sig: Take by mouth      Facility-Administered Medications: None       Past Medical History:   Diagnosis Date    Asthma     rescue inhaler prn    Attention deficit disorder     [facility]ipolar disorder ([facility] )     Epilepsy ([facility] )     no seizure since 2012 - ? related to drug use - had EEG    Seizures ([facility] )     Spontaneous         Past Surgical History:   Procedure Laterality Date    MULTIPLE TOOTH EXTRACTIONS         Family History   Problem Relation Age of Onset    Hypertension Mother     Hypertension Father     Heart attack Maternal Grandfather     Stroke Paternal Grandfather      I have reviewed and agree with the history as documented  Social History   Substance Use Topics    Smoking status: Current Every Day Smoker     Packs/day: 0 25     Years: 10 00    Smokeless tobacco: Never Used      Comment: FORMER SMOKER AS PER ALL SCRIPTS     Alcohol use No        Review of Systems   Gastrointestinal: Negative for abdominal pain  Genitourinary: Positive for vaginal bleeding and vaginal discharge  Negative for difficulty urinating and dysuria  Musculoskeletal: Negative for back pain  All other systems reviewed and are negative  Physical Exam  Physical Exam   Constitutional: She is oriented to person, place, and time  She appears well-developed and well-nourished  No distress  HENT:   Head: Normocephalic  Nose: Nose normal    Mouth/Throat: Oropharynx is clear and moist  No oropharyngeal exudate  Eyes: Conjunctivae and EOM are normal  Pupils are equal, round, and reactive to light  Neck: Normal range of motion  Neck supple  Cardiovascular: Normal rate, regular rhythm, normal heart sounds and intact distal pulses  Pulmonary/Chest: Effort normal and breath sounds normal    Abdominal: Soft  Bowel sounds are normal  She exhibits no distension  There is no tenderness  There is no rebound and no guarding         Genitourinary: Pelvic exam was performed with patient supine  There is no rash or tenderness on the right labia  There is no rash or tenderness on the left labia  Uterus is enlarged  Genitourinary Comments: No vaginal bleeding, + erythema of vulva, minimal tenderness,no discharge present on visual expection  Musculoskeletal: Normal range of motion  She exhibits no edema, tenderness or deformity  Lymphadenopathy:     She has no cervical adenopathy  Neurological: She is alert and oriented to person, place, and time  She has normal strength and normal reflexes  No cranial nerve deficit or sensory deficit  She exhibits normal muscle tone  Coordination and gait normal    Skin: Skin is warm, dry and intact  No rash noted  Psychiatric: Judgment and thought content normal  She is agitated  Nursing note and vitals reviewed        Vital Signs  ED Triage Vitals [07/06/18 0350]   Temperature Pulse Respirations Blood Pressure SpO2   98 3 °F (36 8 °C) 99 20 119/55 97 %      Temp Source Heart Rate Source Patient Position - Orthostatic VS BP Location FiO2 (%)   Oral Monitor Lying Right arm --      Pain Score       No Pain           Vitals:    07/06/18 0350   BP: 119/55   Pulse: 99   Patient Position - Orthostatic VS: Lying       Visual Acuity      ED Medications  Medications - No data to display    Diagnostic Studies  Results Reviewed     Procedure Component Value Units Date/Time    Urine culture [[id number]] Collected:  07/06/18 0447    Lab Status:  Final result Specimen:  Urine from Urine, Clean Catch Updated:  07/07/18 0929     Urine Culture [id number] cfu/[id number]     Urine Microscopic [[id number]]  (Abnormal) Collected:  07/06/18 0447    Lab Status:  Final result Specimen:  Urine from Urine, Clean Catch Updated:  07/06/18 0506     RBC, UA 10-20 (A) /hpf      WBC, UA 10-20 (A) /hpf      Epithelial Cells Occasional /hpf      Bacteria, UA Moderate (A) /hpf      OTHER OBSERVATIONS Yeast Cells Present     MUCOUS THREADS Occasional (A)    UA w Reflex to Microscopic w Reflex to Culture [[id number]]  (Abnormal) Collected:  07/06/18 0447    Lab Status:  Final result Specimen:  Urine from Urine, Clean Catch Updated:  07/06/18 0457     Color, UA Yellow     Clarity, UA Cloudy     Specific Gravity, UA >=1 030     pH, UA 6 0     Leukocytes, UA Small (A)     Nitrite, UA Negative     Protein, UA 30 (1+) (A) mg/dl      Glucose, UA Negative mg/dl      Ketones, UA Negative mg/dl      Urobilinogen, UA 1 0 E U /dl      Bilirubin, UA Small (A)     Blood, UA Moderate (A)    ED Urine Macroscopic [[id number]]  (Abnormal) Collected:  07/06/18 0458    Lab Status:  Final result Specimen:  Urine Updated:  07/06/18 0451     Color, UA Yellow     Clarity, UA Clear     pH, UA 6 0     Leukocytes, UA Small (A)     Nitrite, UA Negative     Protein,  (2+) (A) mg/dl      Glucose, UA Negative mg/dl      Ketones, UA Negative mg/dl      Urobilinogen, UA 1 0 E U /dl      Bilirubin, UA Interference- unable to analyze (A)     Blood, UA Moderate (A)     Specific Gravity, UA >=1 030    Narrative:       CLINITEK RESULT                 No orders to display              Procedures  Procedures       Phone Contacts  ED Phone Contact    ED Course                               MDM  Number of Diagnoses or Management Options  Urinary tract infection affecting pregnancy, antepartum: new and requires workup  Vaginal bleeding in pregnancy, second trimester: new and requires workup  Vulvovaginal candidiasis: new and requires workup     Amount and/or Complexity of Data Reviewed  Clinical lab tests: ordered and reviewed  Decide to obtain previous medical records or to obtain history from someone other than the patient: yes  Review and summarize past medical records: yes  Independent visualization of images, tracings, or specimens: yes    Risk of Complications, Morbidity, and/or Mortality  General comments: Pt  Refused straight cath urine specimen and speculum exam to evaluate for hematuria vs vaginal bleeding    Pt  Has hx of noncompliance and is unreliable to f/u due to opiate abuse, will treat empirically for UTI and candidiasis to reduce risk of PTL  Pt  To f/u with ob for eval of vaginal spotting  FHTs detected and normal     Patient Progress  Patient progress: stable    CritCare Time    Disposition  Final diagnoses:   Vulvovaginal candidiasis   Vaginal bleeding in pregnancy, second trimester   Urinary tract infection affecting pregnancy, antepartum     Time reflects when diagnosis was documented in both MDM as applicable and the Disposition within this note     Time User Action Codes Description Comment    7/6/2018  4:25 AM Nicolle Garay [B37 3] Vulvovaginal candidiasis     7/6/2018  5:23 AM Nicolle Garay [O46 [id number]] Vaginal bleeding in pregnancy, second trimester     7/6/2018  5:23 AM Nicolle Garay [[id number]] Urinary tract infection affecting pregnancy, antepartum       ED Disposition     ED Disposition Condition Comment    Discharge  [place] Min discharge to home/self care      Condition at discharge: Stable        Follow-up Information     Follow up With Specialties Details Why [id number] [facility] Schedule an appointment as soon as possible for a visit in 1 day For recheck of current symptoms [address] [id number]  [phone]          Discharge Medication List as of 7/6/2018  5:25 AM      START taking these medications    Details   cephalexin (KEFLEX) 250 mg capsule Take 2 capsules (500 mg total) by mouth every 12 (twelve) hours for 7 days, Starting Fri 7/6/2018, Until Fri 7/13/2018, Normal      clotrimazole (GYNE-LOTRIMIN) 1 % vaginal cream Insert 1 applicator into the vagina daily at bedtime for 7 days, Starting Fri 7/6/2018, Until Fri 7/13/2018, Normal         CONTINUE these medications which have NOT CHANGED    Details   ALBUTEROL IN Inhale as needed, Historical Med      METHADONE HCL PO Take 30 mg by mouth, Historical Med Prenatal Vit-Fe Fumarate-FA (PRENATAL VITAMIN PO) Take by mouth, Starting Mon 3/27/2017, Historical Med           No discharge procedures on file      ED Provider  Electronically Signed by           Isaias Bauman DO  07/09/18 9673

## 2018-07-06 NOTE — DISCHARGE INSTRUCTIONS
Urinary Tract Infection in Pregnancy   WHAT YOU NEED TO KNOW:   A urinary tract infection (UTI) is caused by bacteria that get inside your urinary tract  The urinary tract includes your kidneys and bladder  UTIs are common in women, especially during pregnancy  This is because of changes in your immune system, hormones, and uterus  As your uterus grows, your bladder may not completely empty  Bacteria can grow in the urine left in your bladder and cause a UTI  UTIs during pregnancy can increase your risk for a kidney infection and  labor  DISCHARGE INSTRUCTIONS:   Return to the emergency department if:   · You are urinating very little or not at all  · You have severe pain  · You have a fever and chills  Contact your healthcare provider if:   · You have pain in the sides of your back  · You do not feel better after 2 days of treatment  · You are vomiting  · You have questions or concerns about your condition or care  Medicines:   · Antibiotics  help fight a bacterial infection  · Medicines  may be given to decrease pain and burning when you urinate  They will also help decrease the feeling that you need to urinate often  These medicines will make your urine orange or red  · Take your medicine as directed  Contact your healthcare provider if you think your medicine is not helping or if you have side effects  Tell him or her if you are allergic to any medicine  Keep a list of the medicines, vitamins, and herbs you take  Include the amounts, and when and why you take them  Bring the list or the pill bottles to follow-up visits  Carry your medicine list with you in case of an emergency  Prevent another UTI:   · Urinate when you feel the urge  Do not hold your urine  Urinate as soon as needed  Always urinate after you have sex  This helps flush out bacteria passed during sex  · Drink liquids as directed  Ask how much liquid to drink each day and which liquids are best for you  You may need to drink more fluids than usual to help flush bacteria out of your urinary tract  Do not drink caffeine or carbonated liquids  These drinks can irritate your bladder  Your healthcare provider may recommend cranberry juice to help prevent a UTI  · Wipe from front to back after you urinate or have a bowel movement  This will help prevent germs from getting into your urinary tract through your urethra  · Do pelvic muscle exercises often  Pelvic muscle exercises may help you start and stop urinating  Strong pelvic muscles may help you empty your bladder easier  Squeeze these muscles tightly for 5 seconds like you are trying to hold back urine  Then relax for 5 seconds  Gradually work up to squeezing for 10 seconds  Do 3 sets of 15 repetitions a day, or as directed  Follow up with your healthcare provider as directed: You may need to return for more urine tests  Write down your questions so you remember to ask them during your visits  © 2017 [address] [employer] Information is for End User's use only and may not be sold, redistributed or otherwise used for commercial purposes  All illustrations and images included in CareNotes® are the copyrighted property of A D A M , Inc  or Hayden Lopez  The above information is an  only  It is not intended as medical advice for individual conditions or treatments  Talk to your doctor, nurse or pharmacist before following any medical regimen to see if it is safe and effective for you  Vulvovaginal Candidiasis   WHAT YOU NEED TO KNOW:   What is vulvovaginal candidiasis? Vulvovaginal candidiasis, or yeast infection, is a common vaginal infection  Vulvovaginal candidiasis is caused by a fungus, or yeast-like germ  Fungi are normally found in your vagina  When there are too many fungi, it can cause an infection  What increases my risk for vulvovaginal candidiasis?    · Pregnancy    · Medical conditions that suppress your immune system, such as diabetes or HIV and AIDS    · Medicines, such as antibiotics, birth control pills, or steroid medicine    · Contraceptive devices, such as diaphragms, sponges, and intrauterine devices  What are the signs and symptoms of vulvovaginal candidiasis? · Thick, white, cheese-like discharge from your vagina    · Itching, swelling, or redness in your vagina    · Burning when you urinate  How is vulvovaginal candidiasis diagnosed? Your healthcare provider will ask about your medical history and examine you  A sample of your vaginal discharge may show what germ is causing your infection  How is vulvovaginal candidiasis treated? Medicines help treat the fungal infection and decrease inflammation  The medicine may be a pill, topical cream, or vaginal suppository  With treatment, the infection is usually gone within a week: How can I manage my symptoms? · Wear cotton underwear  · Keep the vaginal area clean and dry  · Wipe from front to back after you urinate or have a bowel movement  · Do not have sex until your symptoms are gone  · Do not douche  · Do not use strong perfumes or soaps  · Do not use feminine hygiene sprays, powders, or bubble bath  How can I prevent another infection? · Take showers instead of baths  · Eat yogurt  · Limit the amount of alcohol you drink  · Limit your time in hot tubs  · Control your blood sugar if you are diabetic  When should I seek immediate care? · You have fever and chills  · You are bleeding from your vagina and it is not your monthly period  · You develop abdominal or pelvic pain  When should I contact my healthcare provider? · Your signs and symptoms get worse, even after treatment  · You have questions or concerns about your condition or care  CARE AGREEMENT:   You have the right to help plan your care  Learn about your health condition and how it may be treated   Discuss treatment options with your caregivers to decide what care you want to receive  You always have the right to refuse treatment  The above information is an  only  It is not intended as medical advice for individual conditions or treatments  Talk to your doctor, nurse or pharmacist before following any medical regimen to see if it is safe and effective for you  © 2017 [address] [employer] Information is for End User's use only and may not be sold, redistributed or otherwise used for commercial purposes  All illustrations and images included in CareNotes® are the copyrighted property of A D A M , Inc  or [address]

## 2018-07-07 LAB — BACTERIA UR CULT: NORMAL

## 2018-07-10 ENCOUNTER — TELEPHONE (OUTPATIENT)
Dept: OBGYN CLINIC | Facility: CLINIC | Age: 28
End: 2018-07-10

## 2018-07-10 NOTE — TELEPHONE ENCOUNTER
OB - 30 wks - lm pt's as re: non-compliance with appts - last OB appt here 5/15/18, last [facility] u/s 6/14/18 - has had sev appts with [facility] for suboxone tx but has also cancelled numerous appts  She was prev referred to [facility] (had appt on 5/21/18 (nurse) & MD appt on  5/23/18) - spoke with [facility] today @ [facility] confirmed that pt has not been seen there or scheduled any appts - pt had stated she was not continuing care there & going back to [facility]  More recently [facility] referred her to [facility] ob/gyn clinic but no appts scheduled  Spoke with Samuel Nicole @ [facility]  - pt has appt scheduled @ [facility] 7/11/18 @ 0900  She will contact me re: if pt is going to continue care here or [facility]

## 2018-07-17 ENCOUNTER — ROUTINE PRENATAL (OUTPATIENT)
Dept: OBGYN CLINIC | Facility: CLINIC | Age: 28
End: 2018-07-17
Payer: COMMERCIAL

## 2018-07-17 VITALS — SYSTOLIC BLOOD PRESSURE: 108 MMHG | DIASTOLIC BLOOD PRESSURE: 64 MMHG | BODY MASS INDEX: 21.33 KG/M2 | WEIGHT: 105.6 LBS

## 2018-07-17 DIAGNOSIS — Z34.83 PRENATAL CARE, SUBSEQUENT PREGNANCY, THIRD TRIMESTER: Primary | ICD-10-CM

## 2018-07-17 PROCEDURE — 99213 OFFICE O/P EST LOW 20 MIN: CPT | Performed by: OBSTETRICS & GYNECOLOGY

## 2018-07-17 RX ORDER — CEPHALEXIN 500 MG/1
CAPSULE ORAL
Refills: 0 | COMMUNITY
Start: 2018-07-06 | End: 2018-08-28 | Stop reason: ALTCHOICE

## 2018-07-17 NOTE — PROGRESS NOTES
Patient has not been seen in our office since April  She has had appts at the  center  She was under the assumption that she only had to be seen at the  center  She knows now that she also needs to be seen here  She states she is doing well  Denies LOF/Bleeding/Cramping  Baby is moving well  She has been off heroin for 3 weeks  She is on Methadone which is being managed by [facility] and she is also going through counseling once a week  She states the Suboxone was not effective and she was still using heroin while taking it  The methadone is working well for her  She is also being set up with mental health provider for her anxiety and depression  She is scheduled for growth scan at  center tomorrow  28 week labs ordered today  Advised to have them drawn as soon as possible  We reviewed fetal kick counts, as well adequate hydration  She was reminded to bring a urine specimen to her next visit  She is to return in 2 weeks

## 2018-07-18 ENCOUNTER — ULTRASOUND (OUTPATIENT)
Dept: PERINATAL CARE | Facility: CLINIC | Age: 28
End: 2018-07-18
Payer: COMMERCIAL

## 2018-07-18 VITALS
SYSTOLIC BLOOD PRESSURE: 112 MMHG | WEIGHT: 105.4 LBS | DIASTOLIC BLOOD PRESSURE: 67 MMHG | HEIGHT: 59 IN | HEART RATE: 75 BPM | BODY MASS INDEX: 21.25 KG/M2

## 2018-07-18 DIAGNOSIS — O99.323 HIGH RISK PREGNANCY DUE TO MATERNAL DRUG ABUSE IN THIRD TRIMESTER (HCC): ICD-10-CM

## 2018-07-18 DIAGNOSIS — F19.10 HIGH RISK PREGNANCY DUE TO MATERNAL DRUG ABUSE IN THIRD TRIMESTER (HCC): ICD-10-CM

## 2018-07-18 DIAGNOSIS — Z3A.31 31 WEEKS GESTATION OF PREGNANCY: ICD-10-CM

## 2018-07-18 DIAGNOSIS — Z36.89 ENCOUNTER FOR ULTRASOUND TO CHECK FETAL GROWTH: Primary | ICD-10-CM

## 2018-07-18 PROCEDURE — 99212 OFFICE O/P EST SF 10 MIN: CPT | Performed by: OBSTETRICS & GYNECOLOGY

## 2018-07-18 PROCEDURE — 76816 OB US FOLLOW-UP PER FETUS: CPT | Performed by: OBSTETRICS & GYNECOLOGY

## 2018-07-18 NOTE — PROGRESS NOTES
Email sent to Dr Linda Messer for NICU consult for LATOSHA on Monday 7/23 at 2 pm   Confirmed by Dr Linda Messer and patient aware

## 2018-07-18 NOTE — PROGRESS NOTES
[id number]: Ms Daxa Bennett was seen today at 31w1d for fetal growth assessment ultrasound  See ultrasound report under "OB Procedures" tab  Please don't hesitate to contact our office with any concerns or questions    Vivienne Byers MD

## 2018-07-24 NOTE — CONSULTS
I met with in Cj Youngblood and her boyfriend on 7/23/18 in NICU  Consult was requested by OBGyn due to maternal opiod use  Mother is a habitual user of heroin and has been on methadone for past several weeks  Currently on 35 mg of methadone  She was on suboxone earlier in pregnancy but but she relapsed on that so was switched to methadone  She verbalized that she is fairly well controlled on methadone  She is 31 4/7 weeks today  She expressed that pregnancy has been uneventful so far  Her last use of heroin was 3 weeks ago  Her only other med is prenatal vitamins  I explained the mandatory 5 day observation period after infant is born and ac scoring system to assess opioid withdrawal   I also explained that withdrawal is likely to happen at 48-72 hr after birth due to long half life of methadone  Should infant were to withdraw I explained that infant will be transferred to NICu and will then be started on oral morphine and will continue LATOSHA scoring  If scores are acceptable( ie, <8) oral morphine will be weaned 10% every 48 hrs till morphine is off  Then after 48 hr observation off of morphine infant is d/irina home  The whole process was explained and also infirmed that the average length of stay of infant who withdraws and need pharmacological treatment is 3-4 weeks  She wants to provide breast milk which was encouraged  I counseled on the benefits of breast feeding in general and also benefit In this particular case of infant receiving small doses of methadone in breast milk helping with withdrawal I also explained that it is very critical that she should not be using any street drugs ( only the medically prescribed methadone)while breast feeding her infant  I explained the visitation policies and also explained RICHAR view camera  Ms Yoli Velazquez did ask about the CYS involvement   I explained that there will be a UDS and cord tox screen done on infant after birth and as per hospital policy social work will inform CYS about this case  However all long as she is compliant with treatment program and remains clean off drugs decision will be favorable as far as her retaining custody of her child   All questions were answered  Total time spent on face to face contact: 30 min

## 2018-08-08 ENCOUNTER — ULTRASOUND (OUTPATIENT)
Dept: PERINATAL CARE | Facility: CLINIC | Age: 28
End: 2018-08-08
Payer: COMMERCIAL

## 2018-08-08 VITALS
BODY MASS INDEX: 21.94 KG/M2 | HEIGHT: 58 IN | HEART RATE: 80 BPM | SYSTOLIC BLOOD PRESSURE: 113 MMHG | DIASTOLIC BLOOD PRESSURE: 70 MMHG | WEIGHT: 104.5 LBS

## 2018-08-08 DIAGNOSIS — Z3A.34 34 WEEKS GESTATION OF PREGNANCY: ICD-10-CM

## 2018-08-08 DIAGNOSIS — O99.323 HIGH RISK PREGNANCY DUE TO MATERNAL DRUG ABUSE IN THIRD TRIMESTER (HCC): ICD-10-CM

## 2018-08-08 DIAGNOSIS — Z36.89 ENCOUNTER FOR ULTRASOUND TO CHECK FETAL GROWTH: Primary | ICD-10-CM

## 2018-08-08 DIAGNOSIS — F19.10 HIGH RISK PREGNANCY DUE TO MATERNAL DRUG ABUSE IN THIRD TRIMESTER (HCC): ICD-10-CM

## 2018-08-08 PROCEDURE — 99212 OFFICE O/P EST SF 10 MIN: CPT | Performed by: OBSTETRICS & GYNECOLOGY

## 2018-08-08 PROCEDURE — 76816 OB US FOLLOW-UP PER FETUS: CPT | Performed by: OBSTETRICS & GYNECOLOGY

## 2018-08-08 NOTE — Clinical Note
Hi this baby is still breech,  i'm bringing her back in 2 weeks to check on position and if still breech will schedule a version, sound oK?

## 2018-08-08 NOTE — PROGRESS NOTES
[address]: Ms Elissa Fulton was seen today at 34w1d for fetal growth assessment ultrasound  See ultrasound report under "OB Procedures" tab  Baby is still BREECH  Please don't hesitate to contact our office with any concerns or questions    Sonia Will MD

## 2018-08-28 ENCOUNTER — ULTRASOUND (OUTPATIENT)
Dept: PERINATAL CARE | Facility: CLINIC | Age: 28
End: 2018-08-28
Payer: COMMERCIAL

## 2018-08-28 VITALS
HEART RATE: 87 BPM | BODY MASS INDEX: 22.52 KG/M2 | DIASTOLIC BLOOD PRESSURE: 83 MMHG | SYSTOLIC BLOOD PRESSURE: 127 MMHG | WEIGHT: 107.3 LBS | HEIGHT: 58 IN

## 2018-08-28 DIAGNOSIS — O99.323 METHADONE MAINTENANCE TREATMENT AFFECTING PREGNANCY IN THIRD TRIMESTER (HCC): ICD-10-CM

## 2018-08-28 DIAGNOSIS — Z3A.37 37 WEEKS GESTATION OF PREGNANCY: ICD-10-CM

## 2018-08-28 DIAGNOSIS — F11.20 METHADONE MAINTENANCE TREATMENT AFFECTING PREGNANCY IN THIRD TRIMESTER (HCC): ICD-10-CM

## 2018-08-28 DIAGNOSIS — F19.10 HIGH RISK PREGNANCY DUE TO MATERNAL DRUG ABUSE IN THIRD TRIMESTER (HCC): ICD-10-CM

## 2018-08-28 DIAGNOSIS — O99.323 HIGH RISK PREGNANCY DUE TO MATERNAL DRUG ABUSE IN THIRD TRIMESTER (HCC): ICD-10-CM

## 2018-08-28 PROCEDURE — 76815 OB US LIMITED FETUS(S): CPT | Performed by: OBSTETRICS & GYNECOLOGY

## 2018-08-28 NOTE — PROGRESS NOTES
Patient referred for ultrasound secondary to breech position only  Prior to her visit today she considered her discussion with Dr Marcie Adams and has now decided to decline a version  Ultrasound shows the fetus is still breech  I did not see her personally at this visit  As I close this note I see she has not followed up with Dr Miko Mari office since July  She has not completed blood work from  or   She states to my [facility] that she just got her insurance reinstated and will be calling Dr Miko Mari office for an appointment  Delivery after 39 weeks by  section is recommended if the baby is still breech at that time      Dmitry Kaplan MD

## 2018-09-07 ENCOUNTER — ROUTINE PRENATAL (OUTPATIENT)
Dept: OBGYN CLINIC | Facility: CLINIC | Age: 28
End: 2018-09-07
Payer: COMMERCIAL

## 2018-09-07 ENCOUNTER — TELEPHONE (OUTPATIENT)
Dept: OBGYN CLINIC | Facility: CLINIC | Age: 28
End: 2018-09-07

## 2018-09-07 VITALS — WEIGHT: 106 LBS | DIASTOLIC BLOOD PRESSURE: 78 MMHG | BODY MASS INDEX: 22.15 KG/M2 | SYSTOLIC BLOOD PRESSURE: 122 MMHG

## 2018-09-07 DIAGNOSIS — O99.323 HIGH RISK PREGNANCY DUE TO MATERNAL DRUG ABUSE IN THIRD TRIMESTER (HCC): ICD-10-CM

## 2018-09-07 DIAGNOSIS — O32.9XX1 MALPRESENTATION BEFORE ONSET OF LABOR, FETUS 1: Primary | ICD-10-CM

## 2018-09-07 DIAGNOSIS — F19.10 HIGH RISK PREGNANCY DUE TO MATERNAL DRUG ABUSE IN THIRD TRIMESTER (HCC): ICD-10-CM

## 2018-09-07 PROCEDURE — 99213 OFFICE O/P EST LOW 20 MIN: CPT | Performed by: OBSTETRICS & GYNECOLOGY

## 2018-09-07 PROCEDURE — 87653 STREP B DNA AMP PROBE: CPT | Performed by: OBSTETRICS & GYNECOLOGY

## 2018-09-07 RX ORDER — ONDANSETRON 2 MG/ML
4 INJECTION INTRAMUSCULAR; INTRAVENOUS EVERY 8 HOURS PRN
Status: CANCELLED | OUTPATIENT
Start: 2018-09-07

## 2018-09-07 RX ORDER — SODIUM CHLORIDE, SODIUM LACTATE, POTASSIUM CHLORIDE, CALCIUM CHLORIDE 600; 310; 30; 20 MG/100ML; MG/100ML; MG/100ML; MG/100ML
125 INJECTION, SOLUTION INTRAVENOUS CONTINUOUS
Status: CANCELLED | OUTPATIENT
Start: 2018-09-07

## 2018-09-07 NOTE — PROGRESS NOTES
Reviewed patient's history since last office visit  Here with   States last heroin use was late July  Tried Suboxone x 3d but didn't work and still needed to use  Transitioned to methadone 50mg qd and has stayed there ever since  Goes to [employer] (counselor - Nasir Ren; [phone]  +FM  No uc's or VB  28 week labs not done yet  GBBS done today  Bedside sono confirms mary breech  Plan  > 39wks 0d  Will d/w Dr Suhail Petty re: scheduling  [place] of procedure, risks, recovery, los all reviewed with patient and   Questions answered

## 2018-09-07 NOTE — PROGRESS NOTES
H&P Exam - Obstetrics   Aleyda Griffith 29 y o  female MRN: [id number]        History of Present Illness   Chief Complaint: Elective     HPI:  Aleyda Griffith is a 29 y o   female with an WILLAM of 2018, by Last Menstrual Period at 39w0d weeks gestation who is being admitted for Elective Primary  to persistent breech presentation  Her current obstetrical history is significant for heroin use - currently on methadone  Contractions: None  Leakage of fluid: None  Bleeding: None  Fetal movement: present  Pregnancy complications: drug use heroin      Review of Systems: negative    Historical Information   OB History    Para Term  AB Living   4 0 0 0 3 0   SAB TAB Ectopic Multiple Live Births   1 2 0 0 0      # Outcome Date GA Lbr Giovani/2nd Weight Sex Delivery Anes PTL Lv   4 Current            3 TAB 17 15w0d          2 TAB 09/01/15 7w0d          1 SAB 14 8w0d               Past Medical History:   Diagnosis Date    Asthma     rescue inhaler prn    Attention deficit disorder     Bipolar disorder ([facility] )     Epilepsy ([facility] )     no seizure since  - ? related to drug use - had EEG    Seizures ([facility] )     Spontaneous       Past Surgical History:   Procedure Laterality Date    MULTIPLE TOOTH EXTRACTIONS       Social History   History   Alcohol Use No     History   Drug Use    Frequency: 70 0 times per week    Types: Heroin     Comment: HISTORY OF HEROINE USE AND METHAMPHETAMINES AS PER ALL SCRIPTS      History   Smoking Status    Current Every Day Smoker    Packs/day: 0 25    Years: 10 00   Smokeless Tobacco    Never Used     Comment: FORMER SMOKER AS PER ALL SCRIPTS      Family History: depression, drug abuse, HTN, MI, CVA    Meds/Allergies   all medications and allergies reviewed  Allergies   Allergen Reactions    Hydrocodone Other (See Comments)     Was told by her mom from when she was younger  Pt says mom told her she was allergic    Ketorolac Myalgia    Thorazine [Chlorpromazine] Seizures    Vicodin [Hydrocodone-Acetaminophen] Other (See Comments)     Unknown reaction, her mom told her she was allergic    Hydroxyzine Nausea Only and Vomiting       Objective   Vitals: Last menstrual period 12/12/2017  /78 Wt  48 1kg  Physical Exam:   Gen - A&O, NAD  CV - RRR, normal S1 and S2  Pulm - CTA bilaterally  Abd - gravid, NT  Ext - no edema, no calf tenderness  Pelvic - deferred       Prenatal Labs: I have personally reviewed pertinent reports  A/P:  [id number] @ 39wks 0d persistent breech presentation, h/o heroin abuse on methadone  Primary LTCS under regional anesthesia  Patient declines external version  Risks of procedure including bleeding, transfusion, infection, VTE, d/w pt    Urine tox on admission  Social Work Consult

## 2018-09-09 LAB — GP B STREP DNA SPEC QL NAA+PROBE: NORMAL

## 2018-09-10 ENCOUNTER — ROUTINE PRENATAL (OUTPATIENT)
Dept: OBGYN CLINIC | Facility: CLINIC | Age: 28
End: 2018-09-10
Payer: COMMERCIAL

## 2018-09-10 ENCOUNTER — ANESTHESIA EVENT (INPATIENT)
Dept: LABOR AND DELIVERY | Facility: HOSPITAL | Age: 28
DRG: 540 | End: 2018-09-10
Payer: COMMERCIAL

## 2018-09-10 VITALS — BODY MASS INDEX: 22.57 KG/M2 | DIASTOLIC BLOOD PRESSURE: 70 MMHG | SYSTOLIC BLOOD PRESSURE: 108 MMHG | WEIGHT: 108 LBS

## 2018-09-10 DIAGNOSIS — Z34.83 PRENATAL CARE, SUBSEQUENT PREGNANCY, THIRD TRIMESTER: Primary | ICD-10-CM

## 2018-09-10 DIAGNOSIS — O99.323 HIGH RISK PREGNANCY DUE TO MATERNAL DRUG ABUSE IN THIRD TRIMESTER (HCC): ICD-10-CM

## 2018-09-10 DIAGNOSIS — F19.10 HIGH RISK PREGNANCY DUE TO MATERNAL DRUG ABUSE IN THIRD TRIMESTER (HCC): ICD-10-CM

## 2018-09-10 PROCEDURE — 99213 OFFICE O/P EST LOW 20 MIN: CPT | Performed by: OBSTETRICS & GYNECOLOGY

## 2018-09-10 NOTE — PROGRESS NOTES
The patient is scheduled for primary  section for breech presentation tomorrow at a [facility]  [facility] She is taking methadone 50 mg daily  I explained the risks and complications associated with the procedure  She is also aware the for polyp in the in the  intensive care unit  She is still smoking 3-4 cigarettes a day

## 2018-09-11 ENCOUNTER — HOSPITAL ENCOUNTER (INPATIENT)
Facility: HOSPITAL | Age: 28
LOS: 4 days | Discharge: HOME | DRG: 540 | End: 2018-09-15
Attending: OBSTETRICS & GYNECOLOGY | Admitting: OBSTETRICS & GYNECOLOGY
Payer: COMMERCIAL

## 2018-09-11 ENCOUNTER — ANESTHESIA (INPATIENT)
Dept: LABOR AND DELIVERY | Facility: HOSPITAL | Age: 28
DRG: 540 | End: 2018-09-11
Payer: COMMERCIAL

## 2018-09-11 DIAGNOSIS — O32.9XX1 MALPRESENTATION BEFORE ONSET OF LABOR, FETUS 1: ICD-10-CM

## 2018-09-11 DIAGNOSIS — Z98.891 S/P PRIMARY LOW TRANSVERSE C-SECTION: Primary | ICD-10-CM

## 2018-09-11 LAB
ABO GROUP BLD: NORMAL
AMPHETAMINES SERPL QL SCN: NEGATIVE
BARBITURATES UR QL: NEGATIVE
BASE EXCESS BLDCOA CALC-SCNC: -3.2 MMOL/L (ref 3–11)
BASE EXCESS BLDCOV CALC-SCNC: -2.5 MMOL/L (ref 1–9)
BENZODIAZ UR QL: NEGATIVE
BLD GP AB SCN SERPL QL: NEGATIVE
COCAINE UR QL: NEGATIVE
ERYTHROCYTE [DISTWIDTH] IN BLOOD BY AUTOMATED COUNT: 14 % (ref 11.6–15.1)
HCO3 BLDCOA-SCNC: 24.7 MMOL/L (ref 17.3–27.3)
HCO3 BLDCOV-SCNC: 23.4 MMOL/L (ref 12.2–28.6)
HCT VFR BLD AUTO: 32.3 % (ref 34.8–46.1)
HGB BLD-MCNC: 10.2 G/DL (ref 11.5–15.4)
MCH RBC QN AUTO: 27.5 PG (ref 26.8–34.3)
MCHC RBC AUTO-ENTMCNC: 31.6 G/DL (ref 31.4–37.4)
MCV RBC AUTO: 87 FL (ref 82–98)
METHADONE UR QL: POSITIVE
OPIATES UR QL SCN: NEGATIVE
OXYHGB MFR BLDCOA: 12.9 %
OXYHGB MFR BLDCOV: 50.9 %
PCO2 BLDCOA: 56 MM[HG] (ref 30–60)
PCO2 BLDCOV: 44.1 MM HG (ref 27–43)
PCP UR QL: NEGATIVE
PH BLDCOA: 7.26 [PH] (ref 7.23–7.43)
PH BLDCOV: 7.34 [PH] (ref 7.19–7.49)
PLATELET # BLD AUTO: 247 THOUSANDS/UL (ref 149–390)
PMV BLD AUTO: 11.9 FL (ref 8.9–12.7)
PO2 BLDCOA: <10 MM HG (ref 5–25)
PO2 BLDCOV: 21.7 MM HG (ref 15–45)
RBC # BLD AUTO: 3.71 MILLION/UL (ref 3.81–5.12)
RH BLD: POSITIVE
RPR SER QL: NORMAL
SAO2 % BLDCOV: 11.2 ML/DL
SPECIMEN EXPIRATION DATE: NORMAL
THC UR QL: NEGATIVE
WBC # BLD AUTO: 11.84 THOUSAND/UL (ref 4.31–10.16)

## 2018-09-11 PROCEDURE — 80307 DRUG TEST PRSMV CHEM ANLYZR: CPT | Performed by: OBSTETRICS & GYNECOLOGY

## 2018-09-11 PROCEDURE — 86850 RBC ANTIBODY SCREEN: CPT | Performed by: OBSTETRICS & GYNECOLOGY

## 2018-09-11 PROCEDURE — 86803 HEPATITIS C AB TEST: CPT | Performed by: OBSTETRICS & GYNECOLOGY

## 2018-09-11 PROCEDURE — 85027 COMPLETE CBC AUTOMATED: CPT | Performed by: OBSTETRICS & GYNECOLOGY

## 2018-09-11 PROCEDURE — 94762 N-INVAS EAR/PLS OXIMTRY CONT: CPT

## 2018-09-11 PROCEDURE — 3E0R3BZ INTRODUCTION OF ANESTHETIC AGENT INTO SPINAL CANAL, PERCUTANEOUS APPROACH: ICD-10-PCS | Performed by: ANESTHESIOLOGY

## 2018-09-11 PROCEDURE — 86901 BLOOD TYPING SEROLOGIC RH(D): CPT | Performed by: OBSTETRICS & GYNECOLOGY

## 2018-09-11 PROCEDURE — 82805 BLOOD GASES W/O2 SATURATION: CPT | Performed by: OBSTETRICS & GYNECOLOGY

## 2018-09-11 PROCEDURE — 94760 N-INVAS EAR/PLS OXIMETRY 1: CPT

## 2018-09-11 PROCEDURE — 59514 CESAREAN DELIVERY ONLY: CPT | Performed by: OBSTETRICS & GYNECOLOGY

## 2018-09-11 PROCEDURE — 86900 BLOOD TYPING SEROLOGIC ABO: CPT | Performed by: OBSTETRICS & GYNECOLOGY

## 2018-09-11 PROCEDURE — 86592 SYPHILIS TEST NON-TREP QUAL: CPT | Performed by: OBSTETRICS & GYNECOLOGY

## 2018-09-11 RX ORDER — SODIUM CHLORIDE, SODIUM LACTATE, POTASSIUM CHLORIDE, CALCIUM CHLORIDE 600; 310; 30; 20 MG/100ML; MG/100ML; MG/100ML; MG/100ML
125 INJECTION, SOLUTION INTRAVENOUS CONTINUOUS
Status: DISCONTINUED | OUTPATIENT
Start: 2018-09-11 | End: 2018-09-12

## 2018-09-11 RX ORDER — ONDANSETRON 2 MG/ML
4 INJECTION INTRAMUSCULAR; INTRAVENOUS EVERY 4 HOURS PRN
Status: DISCONTINUED | OUTPATIENT
Start: 2018-09-11 | End: 2018-09-12

## 2018-09-11 RX ORDER — ONDANSETRON 2 MG/ML
4 INJECTION INTRAMUSCULAR; INTRAVENOUS ONCE AS NEEDED
Status: DISCONTINUED | OUTPATIENT
Start: 2018-09-11 | End: 2018-09-11 | Stop reason: SDUPTHER

## 2018-09-11 RX ORDER — KETOROLAC TROMETHAMINE 30 MG/ML
15 INJECTION, SOLUTION INTRAMUSCULAR; INTRAVENOUS EVERY 6 HOURS SCHEDULED
Status: DISCONTINUED | OUTPATIENT
Start: 2018-09-12 | End: 2018-09-11

## 2018-09-11 RX ORDER — METOCLOPRAMIDE HYDROCHLORIDE 5 MG/ML
5 INJECTION INTRAMUSCULAR; INTRAVENOUS EVERY 6 HOURS PRN
Status: DISCONTINUED | OUTPATIENT
Start: 2018-09-11 | End: 2018-09-12

## 2018-09-11 RX ORDER — ACETAMINOPHEN 325 MG/1
650 TABLET ORAL EVERY 4 HOURS PRN
Status: DISCONTINUED | OUTPATIENT
Start: 2018-09-11 | End: 2018-09-13

## 2018-09-11 RX ORDER — SIMETHICONE 80 MG
80 TABLET,CHEWABLE ORAL 4 TIMES DAILY PRN
Status: DISCONTINUED | OUTPATIENT
Start: 2018-09-11 | End: 2018-09-15 | Stop reason: HOSPADM

## 2018-09-11 RX ORDER — OXYTOCIN/RINGER'S LACTATE 30/500 ML
62.5 PLASTIC BAG, INJECTION (ML) INTRAVENOUS ONCE
Status: COMPLETED | OUTPATIENT
Start: 2018-09-11 | End: 2018-09-12

## 2018-09-11 RX ORDER — KETOROLAC TROMETHAMINE 30 MG/ML
15 INJECTION, SOLUTION INTRAMUSCULAR; INTRAVENOUS EVERY 6 HOURS SCHEDULED
Status: DISCONTINUED | OUTPATIENT
Start: 2018-09-11 | End: 2018-09-11

## 2018-09-11 RX ORDER — METOCLOPRAMIDE HYDROCHLORIDE 5 MG/ML
10 INJECTION INTRAMUSCULAR; INTRAVENOUS ONCE AS NEEDED
Status: DISCONTINUED | OUTPATIENT
Start: 2018-09-11 | End: 2018-09-12

## 2018-09-11 RX ORDER — ALBUTEROL SULFATE 90 UG/1
2 AEROSOL, METERED RESPIRATORY (INHALATION) EVERY 4 HOURS PRN
Status: DISCONTINUED | OUTPATIENT
Start: 2018-09-11 | End: 2018-09-15 | Stop reason: HOSPADM

## 2018-09-11 RX ORDER — DOCUSATE SODIUM 100 MG/1
100 CAPSULE, LIQUID FILLED ORAL DAILY PRN
Status: DISCONTINUED | OUTPATIENT
Start: 2018-09-11 | End: 2018-09-15 | Stop reason: HOSPADM

## 2018-09-11 RX ORDER — DEXAMETHASONE SODIUM PHOSPHATE 4 MG/ML
INJECTION, SOLUTION INTRA-ARTICULAR; INTRALESIONAL; INTRAMUSCULAR; INTRAVENOUS; SOFT TISSUE AS NEEDED
Status: DISCONTINUED | OUTPATIENT
Start: 2018-09-11 | End: 2018-09-11 | Stop reason: SURG

## 2018-09-11 RX ORDER — PROPOFOL 10 MG/ML
INJECTION, EMULSION INTRAVENOUS AS NEEDED
Status: DISCONTINUED | OUTPATIENT
Start: 2018-09-11 | End: 2018-09-11 | Stop reason: SURG

## 2018-09-11 RX ORDER — METHADONE HYDROCHLORIDE 10 MG/1
50 TABLET ORAL DAILY
Status: DISCONTINUED | OUTPATIENT
Start: 2018-09-12 | End: 2018-09-15 | Stop reason: HOSPADM

## 2018-09-11 RX ORDER — ONDANSETRON 2 MG/ML
INJECTION INTRAMUSCULAR; INTRAVENOUS AS NEEDED
Status: DISCONTINUED | OUTPATIENT
Start: 2018-09-11 | End: 2018-09-11 | Stop reason: SURG

## 2018-09-11 RX ORDER — ONDANSETRON 2 MG/ML
4 INJECTION INTRAMUSCULAR; INTRAVENOUS EVERY 8 HOURS PRN
Status: DISCONTINUED | OUTPATIENT
Start: 2018-09-11 | End: 2018-09-11 | Stop reason: SDUPTHER

## 2018-09-11 RX ORDER — BUPIVACAINE HYDROCHLORIDE 7.5 MG/ML
INJECTION, SOLUTION INTRASPINAL AS NEEDED
Status: DISCONTINUED | OUTPATIENT
Start: 2018-09-11 | End: 2018-09-11 | Stop reason: SURG

## 2018-09-11 RX ORDER — METHADONE HYDROCHLORIDE 10 MG/1
50 TABLET ORAL
Status: DISCONTINUED | OUTPATIENT
Start: 2018-09-11 | End: 2018-09-13

## 2018-09-11 RX ORDER — DIPHENHYDRAMINE HYDROCHLORIDE 50 MG/ML
25 INJECTION INTRAMUSCULAR; INTRAVENOUS EVERY 6 HOURS PRN
Status: DISCONTINUED | OUTPATIENT
Start: 2018-09-11 | End: 2018-09-15 | Stop reason: HOSPADM

## 2018-09-11 RX ORDER — MIDAZOLAM HYDROCHLORIDE 1 MG/ML
INJECTION INTRAMUSCULAR; INTRAVENOUS AS NEEDED
Status: DISCONTINUED | OUTPATIENT
Start: 2018-09-11 | End: 2018-09-11 | Stop reason: SURG

## 2018-09-11 RX ORDER — ONDANSETRON 2 MG/ML
4 INJECTION INTRAMUSCULAR; INTRAVENOUS EVERY 8 HOURS PRN
Status: DISCONTINUED | OUTPATIENT
Start: 2018-09-11 | End: 2018-09-15 | Stop reason: HOSPADM

## 2018-09-11 RX ORDER — DIPHENHYDRAMINE HYDROCHLORIDE 50 MG/ML
25 INJECTION INTRAMUSCULAR; INTRAVENOUS EVERY 6 HOURS PRN
Status: DISCONTINUED | OUTPATIENT
Start: 2018-09-11 | End: 2018-09-11 | Stop reason: SDUPTHER

## 2018-09-11 RX ORDER — CALCIUM CARBONATE 200(500)MG
1000 TABLET,CHEWABLE ORAL DAILY PRN
Status: DISCONTINUED | OUTPATIENT
Start: 2018-09-11 | End: 2018-09-15 | Stop reason: HOSPADM

## 2018-09-11 RX ADMIN — HYDROMORPHONE HYDROCHLORIDE 0.5 MG: 1 INJECTION, SOLUTION INTRAMUSCULAR; INTRAVENOUS; SUBCUTANEOUS at 11:37

## 2018-09-11 RX ADMIN — Medication 62.5 MILLI-UNITS/MIN: at 11:22

## 2018-09-11 RX ADMIN — ROPIVACAINE HYDROCHLORIDE: 2 INJECTION, SOLUTION EPIDURAL; INFILTRATION at 20:19

## 2018-09-11 RX ADMIN — ONDANSETRON 4 MG: 2 INJECTION INTRAMUSCULAR; INTRAVENOUS at 09:52

## 2018-09-11 RX ADMIN — PROPOFOL 100 MG: 10 INJECTION, EMULSION INTRAVENOUS at 10:41

## 2018-09-11 RX ADMIN — PHENYLEPHRINE HYDROCHLORIDE 40 MCG/MIN: 10 INJECTION INTRAVENOUS at 10:02

## 2018-09-11 RX ADMIN — ROPIVACAINE HYDROCHLORIDE: 2 INJECTION, SOLUTION EPIDURAL; INFILTRATION at 11:26

## 2018-09-11 RX ADMIN — SODIUM CHLORIDE, SODIUM LACTATE, POTASSIUM CHLORIDE, AND CALCIUM CHLORIDE 1000 ML: .6; .31; .03; .02 INJECTION, SOLUTION INTRAVENOUS at 07:11

## 2018-09-11 RX ADMIN — SODIUM CHLORIDE, SODIUM LACTATE, POTASSIUM CHLORIDE, AND CALCIUM CHLORIDE 125 ML/HR: .6; .31; .03; .02 INJECTION, SOLUTION INTRAVENOUS at 20:03

## 2018-09-11 RX ADMIN — MIDAZOLAM 2 MG: 1 INJECTION INTRAMUSCULAR; INTRAVENOUS at 10:35

## 2018-09-11 RX ADMIN — METHADONE HYDROCHLORIDE 50 MG: 10 TABLET ORAL at 16:28

## 2018-09-11 RX ADMIN — CEFAZOLIN SODIUM 1000 MG: 1 SOLUTION INTRAVENOUS at 09:59

## 2018-09-11 RX ADMIN — BUPIVACAINE HYDROCHLORIDE IN DEXTROSE 1.6 ML: 7.5 INJECTION, SOLUTION SUBARACHNOID at 09:48

## 2018-09-11 RX ADMIN — SODIUM CHLORIDE, SODIUM LACTATE, POTASSIUM CHLORIDE, AND CALCIUM CHLORIDE 125 ML/HR: .6; .31; .03; .02 INJECTION, SOLUTION INTRAVENOUS at 11:55

## 2018-09-11 RX ADMIN — HYDROMORPHONE HYDROCHLORIDE 0.5 MG: 1 INJECTION, SOLUTION INTRAMUSCULAR; INTRAVENOUS; SUBCUTANEOUS at 11:44

## 2018-09-11 RX ADMIN — HYDROMORPHONE HYDROCHLORIDE 0.5 MG: 1 INJECTION, SOLUTION INTRAMUSCULAR; INTRAVENOUS; SUBCUTANEOUS at 11:17

## 2018-09-11 RX ADMIN — PROPOFOL 50 MG: 10 INJECTION, EMULSION INTRAVENOUS at 10:32

## 2018-09-11 RX ADMIN — PROPOFOL 50 MG: 10 INJECTION, EMULSION INTRAVENOUS at 10:33

## 2018-09-11 RX ADMIN — SODIUM CHLORIDE: 9 INJECTION INTRAMUSCULAR; INTRAVENOUS; SUBCUTANEOUS at 12:15

## 2018-09-11 RX ADMIN — SODIUM CHLORIDE, SODIUM LACTATE, POTASSIUM CHLORIDE, AND CALCIUM CHLORIDE 125 ML/HR: .6; .31; .03; .02 INJECTION, SOLUTION INTRAVENOUS at 07:52

## 2018-09-11 RX ADMIN — DEXAMETHASONE SODIUM PHOSPHATE 4 MG: 4 INJECTION, SOLUTION INTRAMUSCULAR; INTRAVENOUS at 09:52

## 2018-09-11 RX ADMIN — PROPOFOL 50 MG: 10 INJECTION, EMULSION INTRAVENOUS at 10:36

## 2018-09-11 RX ADMIN — HYDROMORPHONE HYDROCHLORIDE 0.5 MG: 1 INJECTION, SOLUTION INTRAMUSCULAR; INTRAVENOUS; SUBCUTANEOUS at 11:11

## 2018-09-11 RX ADMIN — SODIUM CHLORIDE, SODIUM LACTATE, POTASSIUM CHLORIDE, AND CALCIUM CHLORIDE: .6; .31; .03; .02 INJECTION, SOLUTION INTRAVENOUS at 09:32

## 2018-09-11 NOTE — ANESTHESIA PREPROCEDURE EVALUATION
Review of Systems/Medical History  Patient summary reviewed  Chart reviewed  No history of anesthetic complications     Cardiovascular  Negative cardio ROS    Pulmonary  Smoker , Asthma ,        GI/Hepatic            Endo/Other     GYN       Hematology  Anemia ,     Musculoskeletal       Neurology  Seizures (no meds, last one 2012) ,     Psychology   Psychiatric history, Depression , bipolar disorder,     Comment: Former heroin, last used in July, on methadone 50mg daily         Physical Exam    Airway    Mallampati score: II  TM Distance: >3 FB  Neck ROM: full     Dental   upper dentures and lower dentures,     Cardiovascular  Comment: Negative ROS,     Pulmonary      Other Findings        Anesthesia Plan  ASA Score- 3     Anesthesia Type- spinal and epidural with ASA Monitors  Additional Monitors:   Airway Plan:     Comment: CSE: Epidural to remain in place for 24-48 hours for post op pain per surgeon request        Plan Factors-    Induction-     Postoperative Plan- Plan for postoperative opioid use  Informed Consent- Anesthetic plan and risks discussed with patient  I personally reviewed this patient with the CRNA  Discussed and agreed on the Anesthesia Plan with the CRNA  Ana Dawson

## 2018-09-11 NOTE — OP NOTE
OPERATIVE REPORT  PATIENT NAME: Ariadna Sunshine    :  1990  MRN: [id number]  Pt Location: [facility] L&D OR ROOM 02    SURGERY DATE: 2018    Surgeon(s) and Role:     * Sylvia Coburn MD - Primary     * Milvia Underwood DO - Assisting    Preop Diagnosis:  Breech presentation, single or unspecified fetus [O32 1XX0]    Post-Op Diagnosis Codes:     * Breech presentation, single or unspecified fetus [O32 1XX0]    Procedure(s) (LRB):   SECTION () (N/A)      Section Procedure Note    Indications: malpresentation: breech    Pre-operative Diagnosis:   39w0d pregnancy  Methadone use  Hx of IV heroin use    Post-operative Diagnosis:   1LTCS, delivered    Resident: Milvia Underwood DO    Attending: Sylvia Coburn MD    Findings:  Viable male weighing 7lbs 4oz;  Apgar scores of 9 at one minute and 9 at five minutes  Clear amniotic fluid  Normal uterus, tubes, and ovaries  Normal placenta with 3-vessel cord  Arterial cord pH 7 263 base excess -3 2  Venous cord pH 7 342 base excess -2 5    Specimens: Arterial and venous cord gases, cord blood, segment of umbilical cord, placenta to storage     Estimated Blood Loss: 500 mL    Drains: Iqbal catheter           Complications:  None; patient tolerated the procedure well  Disposition: PACU            Condition: stable    Procedure Details   The patient was seen prior to the procedure  Risks, benefits, possible complications, alternate treatment options, and expected outcomes were discussed with the patient  The patient agreed with the proposed plan and gave informed consent  The patient was taken to [facility] Operating Room where she received appropriate combined spinal/epidural anesthesia  Fetal heart tones had been assessed and a Iqbal catheter and SCDs were placed  The abdomen was prepped with Chloraprep, the vagina was prepped with Betadine, and following appropriate drying time, the patient was draped in the usual sterile manner     A Time Out was held and the above information confirmed  The patient was identified as Colleen Barnhart and the procedure verified as  Delivery  A Pfannenstiel incision was made and carried down through the underlying subcutaneous tissue to the fascia using a scalpel  The rectus fascia was then nicked in the midline and dissected laterally using Wilson scissors  The superior edge of the  fascial incision was grasped with Kocher clamps bilaterally, tented upward and the underlying rectus muscles were dissected off bluntly  This was repeated on the inferior edge of the fascia and dissected down to the pubic rami sharply with Wilson scissors  The rectus muscles were  and the peritoneum was identified, entered, and extended longitudinally with blunt dissection  The bladder blade was inserted  The vesicouterine peritoneum was identified, entered sharply, and dissected laterally with  Metzenbaum scissors to form a bladder flap  The bladder blade was repositioned  A low transverse uterine incision was made with the scalpel and extended laterally with blunt dissection  The amnion was entered bluntly  The fetal rump was palpated, elevated, and delivered through the uterine incision followed by the legs, body, arms and head without difficulty  The umbilical cord was clamped and cut  The infant was handed off to the  providers  Arterial and venous cord gases, cord blood, and a segment of umbilical cord were obtained for evaluation  The placenta delivered spontaneously with uterine fundal massage and appeared normal  The uterus was exteriorized and cleaned out with a moist lap sponge  The uterine incision was closed with a running locked suture of 0 Vicryl  A second layer of the same suture was used to imbricate the first   Hemostasis was noted to be excellent  Normal saline solution was used to irrigate the posterior culdesac  The uterus was returned to the abdomen    The paracolic gutters were inspected and cleared of all clots and debris with irrigation and moist lap sponges  The fascia was closed with a running suture of 0 Vicryl  The skin was closed with a subcuticular running suture of 4-0 Monocryl  Staples were applied to the skin incision  The patient appeared to tolerate the procedure very well  Lap sponge, needle, and instrument counts were correct x2  The patient was transferred to her postpartum recovery room in stable condition and her infant went to the  nursery  Attending Attestation: Dr Benjamin Khan MD was present for the entire procedure      Amber Taylor DO  PGY-2 OB/GYN   2018 1:14 PM      Specimen(s):  ID Type Source Tests Collected by Time Destination   A :  Cord Blood Cord BLOOD GAS, VENOUS, CORD, BLOOD GAS, ARTERIAL, CORD Benjamin Khan MD 2018 1019    B :  Tissue (Placenta on Hold) OB Only Placenta PLACENTA IN STORAGE Benjamin Khan MD 2018 1022

## 2018-09-11 NOTE — LACTATION NOTE
This note was copied from a baby's chart  CONSULT - LACTATION  Baby Boy  Chuy Palomino 0 days male MRN: [id number]    [facility] Room / Bed: (N)/(N) Encounter: [id number]    Maternal Information     MOTHER:  Kimberlee Palomino  Maternal Age: 29 y o    OB History: #: 1, Date: 14, Sex: None, Weight: None, GA: 8w0d, Delivery: None, Apgar1: None, Apgar5: None, Living: None, Birth Comments: None    #: 2, Date: 09/01/15, Sex: None, Weight: None, GA: 7w0d, Delivery: None, Apgar1: None, Apgar5: None, Living: None, Birth Comments: None    #: 3, Date: 17, Sex: None, Weight: None, GA: 15w0d, Delivery: None, Apgar1: None, Apgar5: None, Living: None, Birth Comments: None    #: 4, Date: 18, Sex: Male, Weight: 3289 g (7 lb 4 oz), GA: 39w0d, Delivery: , Low Transverse, Apgar1: 9, Apgar5: 9, Living: Living, Birth Comments: None   Previouse breast reduction surgery?  No    Lactation history:   Has patient previously breast fed: No   How long had patient previously breast fed:     Previous breast feeding complications:       Past Surgical History:   Procedure Laterality Date    INDUCED       MULTIPLE TOOTH EXTRACTIONS         Birth information:  YOB: 2018   Time of birth: 10:18 AM   Sex: male   Delivery type: , Low Transverse   Birth Weight: 3289 g (7 lb 4 oz)   Percent of Weight Change: 0%     Gestational Age: 39w0d   [unfilled]    Assessment     Breast and nipple assessment: normal assessment    [place] Assessment: LATOSHA baby, Chomping uncoordinated suck    Feeding assessment: no latch  LATCH:  Latch: Repeated attempts, hold nipple in mouth, stimulate to suck   Audible Swallowing: A few with stimulation   Type of Nipple: Everted (After stimulation)   Comfort (Breast/Nipple): Filling, red/small blisters/bruises, mild/moderate discomfort   Hold (Positioning): Full assist, staff holds infant at breast   LATCH Score: 5          Feeding recommendations:  pump every 2-3 hours     Met with mother  Provided mother with Ready, Set, Baby booklet  Discussed Skin to Skin contact an benefits to mom and baby  Talked about the delay of the first bath until baby has adjusted  Spoke about the benefits of rooming in  Feeding on cue and what that means for recognizing infant's hunger  Avoidance of pacifiers for the first month discussed  Talked about exclusive breastfeeding for the first 6 months  Positioning and latch reviewed as well as showing images of other feeding positions  Discussed the properties of a good latch in any position  Reviewed hand/manual expression  Discussed s/s that baby is getting enough milk and some s/s that breastfeeding dyad may need further help  Gave information on common concerns, what to expect the first few weeks after delivery, preparing for other caregivers, and how partners can help  Resources for support also provided  Information on hand expression given  Discussed benefits of knowing how to manually express breast including stimulating milk supply, softening nipple for latch and evacuating breast in the event of engorgement  Syringe fed breastmilk due to breast refusal at this time  Instructions given on pumping for LATOSHA baby, probable tongue tie  Discussed when to start, frequency, different pumps available versus manual expression  Discussed hygiene of hands and supplies as well as assembly, placement of flanges, size of flanged, preparing the breast and cycles and suction settings on pump  Demonstrated use of hand pump  Discussed labeling of milk, storage, and preparation of stored milk  [employer] MOB and FOB to call for assistance, questions and concerns  Extension number for inpatient lactation support provided      Jamal Godwin RN 9/11/2018 4:39 PM

## 2018-09-11 NOTE — PROGRESS NOTES
Colleen Barnhart  [id number]  9/11/2018  2:07 PM    POST-OP CHECK     S:  Colleen Barnhart doing well  Pain controlled  Denies nausea/vomiting  Denies chest pain, shortness of breath  No complaints at this time  O:  Vitals:    09/11/18 1319   BP: 117/64   Pulse: 62   Resp: 18   Temp: 98 3 °F (36 8 °C)   SpO2: 98%       Exam:  Gen: in NAD  CV: RRR  Resp: CTAB  Abd: soft, appropriately tender, incision c/d/i staples   Ext: non-tender, SCDs in place b/l    A:  Colleen Barnhart is s/p 1LTCS for breech presentation  P:  Routine postop check  Continue methadone at 1600 50mg PO daily  Epidural and dilaudid 0 1mg q10m PCA for analgesia  Clear diet, advance as tolerated  Antiemetics for nausea/vomiting prn  Follow up am labs including Hep C Ab  Adequate UOP, continue to monitor  SCDs for DVT ppx, encourage ambulation as tolerated      Diane Vickers DO  PGY-2 OB/GYN   9/11/2018 2:07 PM

## 2018-09-11 NOTE — ANESTHESIA PROCEDURE NOTES
CSE Block    Patient location during procedure: OR  Start time: 9/11/2018 9:46 AM  Reason for block: procedure for pain and at surgeon's request  Staffing  Resident/CRNA: Moses Zepeda  Preanesthetic Checklist  Completed: patient identified, site marked, surgical consent, pre-op evaluation, timeout performed, IV checked, risks and benefits discussed and monitors and equipment checked  CSE  Patient position: sitting  Prep: ChloraPrep  Patient monitoring: cardiac monitor and continuous pulse ox  Approach: midline  Spinal Needle  Needle type: pencil-tip   Needle gauge: 27 G  Needle length: 10 cm  Epidural Needle  Needle type: Tuohy   Needle gauge: 18 G  Location: lumbar (1-5)  Catheter  Catheter type: side hole  Catheter size: 20 G  Catheter at skin depth: 10 cm  Test dose: negative  Assessment  Injection Assessment:  negative aspiration for heme, no paresthesia on injection, positive aspiration for clear CSF and no pain on injection    Additional Notes  One attempt, easy placement, patient extremely anxious throughout

## 2018-09-11 NOTE — ANESTHESIA POSTPROCEDURE EVALUATION
Post-Op Assessment Note      CV Status:  Stable    Mental Status:  Alert and awake    Hydration Status:  Euvolemic    PONV Controlled:  Controlled    Airway Patency:  Patent    Post Op Vitals Reviewed: Yes          Staff: Anesthesiologist, CRNA     Post-op block assessment: secured with tape, adhesive bandage applied, sterile dressing applied and no complications        BP (P) 110/61 (09/11/18 1100)    Temp (!) (P) 97 4 °F (36 3 °C) (09/11/18 1100)    Pulse (P) 77 (09/11/18 1100)   Resp (P) 16 (09/11/18 1100)    SpO2 (P) 98 % (09/11/18 1100)

## 2018-09-11 NOTE — H&P
H&P Exam - Obstetrics   Brooke Morgan 29 y o  female MRN: [id number]           History of Present Illness         Chief Complaint: Elective      HPI:  Brooke Morgan is a 29 y o   female with an WILLAM of 2018, by Last Menstrual Period at 39w0d weeks gestation who is being admitted for Elective Primary  to persistent breech presentation  Her current obstetrical history is significant for heroin use - currently on methadone  Contractions: None  Leakage of fluid: None  Bleeding: None    Fetal movement: present      Pregnancy complications: drug use heroin      Review of Systems: negative     Historical Information                          OB History    Para Term  AB Living   4 0 0 0 3 0   SAB TAB Ectopic Multiple Live Births   1 2 0 0 0       # Outcome Date GA Lbr Giovani/2nd Weight Sex Delivery Anes PTL Lv   4 Current                     3 TAB 17 15w0d                 2 TAB 09/01/15 7w0d                 1 SAB 14 8w0d                        Medical History        Past Medical History:   Diagnosis Date    Asthma       rescue inhaler prn    Attention deficit disorder      Bipolar disorder ([facility] )      Epilepsy ([facility] )       no seizure since  - ? related to drug use - had EEG    Seizures ([facility] )      Spontaneous            Surgical History         Past Surgical History:   Procedure Laterality Date    MULTIPLE TOOTH EXTRACTIONS            Social History             History   Alcohol Use No            History   Drug Use    Frequency: 70 0 times per week    Types: Heroin       Comment: HISTORY OF HEROINE USE AND METHAMPHETAMINES AS PER ALL SCRIPTS             History   Smoking Status    Current Every Day Smoker    Packs/day: 0 25    Years: 10 00   Smokeless Tobacco    Never Used       Comment: FORMER SMOKER AS PER ALL SCRIPTS       Family History: depression, drug abuse, HTN, MI, CVA     Meds/Allergies   all medications and allergies reviewed Allergies   Allergen Reactions    Hydrocodone Other (See Comments)       Was told by her mom from when she was younger  Pt says mom told her she was allergic    Ketorolac Myalgia    Thorazine [Chlorpromazine] Seizures    Vicodin [Hydrocodone-Acetaminophen] Other (See Comments)       Unknown reaction, her mom told her she was allergic    Hydroxyzine Nausea Only and Vomiting         Objective   Vitals: Last menstrual period 12/12/2017  /78 Wt  48 1kg  Physical Exam:   Gen - A&O, NAD  CV - RRR, normal S1 and S2  Pulm - CTA bilaterally  Abd - gravid, NT  Ext - no edema, no calf tenderness  Pelvic - deferred         Prenatal Labs: I have personally reviewed pertinent reports         A/P:  [id number] @ 39wks 0d persistent breech presentation, h/o heroin abuse on methadone  Primary LTCS under regional anesthesia  Patient declines external version  Risks of procedure including bleeding, transfusion, infection, VTE, d/w pt    Urine tox on admission  Social Work Consult    Ketty Koroma  PGY-2 OB/GYN   9/11/2018 9:19 AM    **NOTE COPIED FROM ORDERS ONLY ENCOUNTER**

## 2018-09-11 NOTE — CONSULTS
Consultation - Acute Pain Service   Fco Faria 29 y o  female MRN: [id number]  Unit/Bed#: LD PACU-01 Encounter: [id number]               Assessment/Plan     Assessment:   POD # 0 s/p primary C section, with epidural in place for postop pain control  She has a 0 2% ropivicaine only running at 8 cc/hr  She does have some pain/discomfort on the left side of her abdomen, but after tilting to the left side and pressing her PCEA button, she feels her pain is more tolerable  She is aware that she may also use a dilaudid PCA button for additional severe breathrough pain  Hx of chronic opioid abuse, last heroin use in July 2018  Tried suboxone x 3 days but says did not work  currently on methadone 50 mg PO daily  She attends [facility] for her methadone treatment, [phone], counselor Kathe President  Spoke to Kathe Preswinsome over the phone: last methadone dose was 9/10/18  Patient is responsible to disclose to [facility] what pain prescriptions she will be discharged on as per Kathe President  Allergies to vicodin and toradol    Current pain regimen:  - epidural 0 2% ropivicaine, 8 cc /hr, 4 cc bolus, 10 min lockout, 3 doses/hr max  - dilaudid PCA - 0 1 mg IV Q 10 min, max 0 5 mg/hr  - methadone 50 mg QD      I have reviewed the patient's controlled substance dispensing history in the Prescription Drug Monitoring Program before prescribing any controlled substances  Plan:   - continue epidural until 9/12 AM    - continue dilaudid PCA   Will determine usage x 24 hour period, and transition to PO pain medications tomorrow (start with oxycodone 5-10 mg Po Q 4 hrs PRN pain)  - continue PO methadone   - order tylenol scheduled dosing    recs communicated to patient, nursing staff, and OB team     APS will continue to follow; please call  / [phone] ( btwn [phone]) with any questions    History of Present Illness    Admit Date:  9/11/2018  Hospital Day:  0 days  Primary Service:  OB/GYN  Attending Provider:  Leah Puentes MD  Reason for Consult / Principal Problem: acute postop pain, on methadone for chronic opioid dependence  Hx and PE limited by: none  HPI: Olga Lidia Perea is a 29y o  year old female who presents with ( see above)    Inpatient consult to Acute Pain Service  Consult performed by: RHIANNON POSADAS  Consult ordered by: Nara Walker          Review of Systems    Historical Information   Past Medical History:   Diagnosis Date    Asthma     rescue inhaler prn    Attention deficit disorder     [facility] [address] [facility]     Epilepsy [facility] [address] [facility]     no seizure since  - ? related to drug use - had EEG    IV drug abuse 2018    MRSA infection     10 years ago 2008    Seizures ([facility] [address] [facility]     Spontaneous      Tachycardia     Varicella      Past Surgical History:   Procedure Laterality Date    INDUCED       MULTIPLE TOOTH EXTRACTIONS       Social History   History   Alcohol Use No     Comment: last used 18     History   Drug Use    Frequency: 70 0 times per week    Types: Heroin     Comment: HISTORY OF HEROINE USE AND METHAMPHETAMINES AS PER ALL SCRIPTS      History   Smoking Status    Current Every Day Smoker    Packs/day: 0 25    Years: 10 00   Smokeless Tobacco    Never Used     Comment: FORMER SMOKER AS PER ALL SCRIPTS      Family History: non-contributory    Meds/Allergies   all current active meds have been reviewed, current meds:   Current Facility-Administered Medications   Medication Dose Route Frequency    diphenhydrAMINE (BENADRYL) injection 25 mg  25 mg Intravenous Q6H PRN    HYDROmorphone (DILAUDID) 1 mg/mL 50 mL PCA   Intravenous Continuous    HYDROmorphone (DILAUDID) injection 0 5 mg  0 5 mg Intravenous Q5 Min PRN    lactated ringers infusion  125 mL/hr Intravenous Continuous    methadone (DOLOPHINE) tablet 50 mg  50 mg Oral Q24H [address]    metoclopramide (REGLAN) injection 10 mg  10 mg Intravenous Once PRN    metoclopramide (REGLAN) injection 5 mg  5 mg Intravenous Q6H PRN    naloxone (NARCAN) 0 04 mg/mL syringe 0 04 mg  0 04 mg Intravenous Q1MIN PRN    ondansetron (ZOFRAN) injection 4 mg  4 mg Intravenous Q8H PRN    ondansetron (ZOFRAN) injection 4 mg  4 mg Intravenous Once PRN    ondansetron (ZOFRAN) injection 4 mg  4 mg Intravenous Q4H PRN    ropivacaine 0 2% PCEA   Epidural Continuous    and PTA meds:   Prior to Admission Medications   Prescriptions Last Dose Informant Patient Reported? Taking? ALBUTEROL IN More than a month at Unknown time Self Yes No   Sig: Inhale as needed   METHADONE HCL PO 9/10/2018 at 1600 Self Yes Yes   Sig: Take 50 mg by mouth     Prenatal Vit-Fe Fumarate-FA (PRENATAL VITAMIN PO) 9/10/2018 at Unknown time Self Yes Yes   Sig: Take by mouth      Facility-Administered Medications: None       Allergies   Allergen Reactions    Hydrocodone Other (See Comments)     Was told by her mom from when she was younger  Pt says mom told her she was allergic    Ketorolac Myalgia    Thorazine [Chlorpromazine] Seizures    Vicodin [Hydrocodone-Acetaminophen] Other (See Comments)     Unknown reaction, her mom told her she was allergic    Hydroxyzine Nausea Only and Vomiting       Objective   Temp:  [97 4 °F (36 3 °C)-98 8 °F (37 1 °C)] 97 8 °F (36 6 °C)  HR:  [] 97  Resp:  [16-18] 18  BP: ()/(56-83) 127/78    Intake/Output Summary (Last 24 hours) at 09/11/18 1200  Last data filed at 09/11/18 1055   Gross per 24 hour   Intake             1200 ml   Output              200 ml   Net             1000 ml       Physical Exam    Lab Results: I have personally reviewed pertinent labs  Imaging Studies: I have personally reviewed pertinent reports  EKG, Pathology, and Other Studies: I have personally reviewed pertinent reports  Counseling / Coordination of Care  Total floor / unit time spent today Level 1 = 20 minutes  Greater than 50% of total time was spent with the patient and / or family counseling and / or coordination of care   A description of the counseling / coordination of care:

## 2018-09-11 NOTE — DISCHARGE SUMMARY
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)    Procedures:   primary  section, low transverse incision    Admitting Attending: Dr Corrie Middleton  Delivery Attending: Dr Corrie Middleton  Discharge Attending: Dr Corrie Middleton    Consult service: Acute Pain Services  Consult attending: Dr Jacque Mederos Course:     Fco Faria is a 29 y o  [id number] who was admitted at 39w0d for a scheduled primary  section for breech presentation  She then underwent an uncomplicated  section delivery and delivered a viable male  at 80  APGARS were 9, 9 at 1 and 5 minutes, respectively   weighed 7lb 4oz  Placenta was delivered at 1020   was then transferred to  nursery  Patient tolerated the procedure well and was transferred to recovery in stable condition  The patient's post partum course was unremarkable    Preoperative hemaglobin was 10 2, postoperative was 8 7  Her postoperative pain was well controlled with an epidural, dilaudid PCA, and she was later transitioned to a PCEA and on day 2 oral analgesics  She was continued on her 50mg Methadone dose at 1600 daily  Her [facility] clinic was contacted pre-operatively and prior to discharge about her status  Her staples were removed post-op day 4 and replaced with steristrips  Of note, her hepatitis C antibody resulted positive  A hepatitis C quantitative analysis was performed and is in process   The patient was informed of her active diagnosis by Dr Corrie Middleton and the treatment team  She was encouraged to see her primary care provider for a referral to a gastroenterologist or hepatologist for treatment of her viral hepatitis  She declined nicoderm patch supplementation for tobacco use but was encouraged to quit smoking for her and her 's general health  On day of discharge, she was ambulating and able to reasonably perform all ADLs  She was voiding and had appropriate bowel function  Pain was well controlled  She was discharged home on post-operative day #4 without complications and without narcotic medications  Patient was instructed to follow up with her OB as an outpatient and was given appropriate warnings to call provider if she develops signs of infection or uncontrolled pain  Complications:   None    Condition at discharge:   good     Provisions for Follow-Up Care:  See after visit summary for information related to follow-up care and any pertinent home health orders  Disposition:   Home    Planned Readmission:   No    Discharge Medications:   Prenatal vitamin daily for 6 months or the duration of nursing whichever is longer  Motrin 600 mg orally every 6 hours as needed for pain  Tylenol (over the counter) per bottle directions as needed for pain  Hydrocortisone cream 1% (over the counter) applied 1-2x daily to hemorrhoids as needed  Witch hazel pads for hemorrhoidal discomfort as needed    Discharge instructions :   -Do not place anything (no partner, tampons or douche) in your vagina for 6 weeks  -You may walk for exercise for the first 6 weeks then gradually return to your usual activities    -Please do not drive for 1 week if you have no stitches and for 2 weeks if you have stitches or underwent a  delivery     -You may take baths or shower per your preference    -Please look at your bust (breasts) in the mirror daily and call provider for redness or tenderness or increased warmth     - If you have had a  please look at your incision daily as well and call provider for increasing redness or steady drainage from the incision    -Please call your provider if temperature > 100 4*F or 38* C, worsening pain or a foul discharge      Maribell Crump DO  PGY-2 OB/GYN   9/17/2018 5:35 PM

## 2018-09-11 NOTE — DISCHARGE INSTRUCTIONS
WHAT YOU NEED TO KNOW:   A , or  section, is abdominal surgery to deliver your baby  DISCHARGE INSTRUCTIONS:   Call 911 for any of the following:   · You feel lightheaded, short of breath, and have chest pain  · You cough up blood  Seek care immediately if:   · Blood soaks through your bandage  · Your stitches come apart  · Your arm or leg feels warm, tender, and painful  It may look swollen and red  Contact your OB if:   · You have heavy vaginal bleeding that fills 1 or more sanitary pads in 1 hour  · You have a fever  · Your incision is swollen, red, or draining pus  · You have questions or concerns about yourself or your baby  Medicines: You may  need any of the following:  · Prescription pain medicine  may be given  Ask how to take this medicine safely  · Acetaminophen  decreases pain and fever  It is available without a doctor's order  Ask how much to take and how often to take it  Follow directions  Acetaminophen can cause liver damage if not taken correctly  · NSAIDs , such as ibuprofen, help decrease swelling, pain, and fever  NSAIDs can cause stomach bleeding or kidney problems in certain people  If you take blood thinner medicine, always ask your healthcare provider if NSAIDs are safe for you  Always read the medicine label and follow directions  · Take your medicine as directed  Contact your healthcare provider if you think your medicine is not helping or if you have side effects  Tell him or her if you are allergic to any medicine  Keep a list of the medicines, vitamins, and herbs you take  Include the amounts, and when and why you take them  Bring the list or the pill bottles to follow-up visits  Carry your medicine list with you in case of an emergency  Wound care:  Carefully wash your wound with soap and water every day  Keep your wound clean and dry  Wear loose, comfortable clothes that do not rub against your wound   Ask your OB about bathing and showering  Limit activity as directed:   · Ask when it is safe for you to drive, walk up stairs, lift heavy objects, and have sex  · Ask when it is okay to exercise, and what types of exercise to do  Start slowly and do more as you get stronger  Drink liquids as directed:  Liquids help keep you hydrated after your procedure and decrease your risk for a blood clot  Ask how much liquid to drink each day and which liquids are best for you  Follow up with your OB as directed: You may need to return to have your stitches or staples removed  Write down your questions so you remember to ask them during your visits  © 2017 [employer] Information is for End User's use only and may not be sold, redistributed or otherwise used for commercial purposes  All illustrations and images included in CareNotes® are the copyrighted property of [employer]  or Hayden Lopez  The above information is an  only  It is not intended as medical advice for individual conditions or treatments  Talk to your doctor, nurse or pharmacist before following any medical regimen to see if it is safe and effective for you

## 2018-09-12 LAB
BASOPHILS # BLD AUTO: 0.03 THOUSANDS/ΜL (ref 0–0.1)
BASOPHILS NFR BLD AUTO: 0 % (ref 0–1)
EOSINOPHIL # BLD AUTO: 0.07 THOUSAND/ΜL (ref 0–0.61)
EOSINOPHIL NFR BLD AUTO: 1 % (ref 0–6)
ERYTHROCYTE [DISTWIDTH] IN BLOOD BY AUTOMATED COUNT: 14 % (ref 11.6–15.1)
HCT VFR BLD AUTO: 28.8 % (ref 34.8–46.1)
HCV AB SER QL: ABNORMAL
HGB BLD-MCNC: 8.7 G/DL (ref 11.5–15.4)
IMM GRANULOCYTES # BLD AUTO: 0.1 THOUSAND/UL (ref 0–0.2)
IMM GRANULOCYTES NFR BLD AUTO: 1 % (ref 0–2)
LYMPHOCYTES # BLD AUTO: 4 THOUSANDS/ΜL (ref 0.6–4.47)
LYMPHOCYTES NFR BLD AUTO: 30 % (ref 14–44)
MCH RBC QN AUTO: 26.6 PG (ref 26.8–34.3)
MCHC RBC AUTO-ENTMCNC: 30.2 G/DL (ref 31.4–37.4)
MCV RBC AUTO: 88 FL (ref 82–98)
MONOCYTES # BLD AUTO: 0.85 THOUSAND/ΜL (ref 0.17–1.22)
MONOCYTES NFR BLD AUTO: 6 % (ref 4–12)
NEUTROPHILS # BLD AUTO: 8.49 THOUSANDS/ΜL (ref 1.85–7.62)
NEUTS SEG NFR BLD AUTO: 62 % (ref 43–75)
NRBC BLD AUTO-RTO: 0 /100 WBCS
PLATELET # BLD AUTO: 193 THOUSANDS/UL (ref 149–390)
PMV BLD AUTO: 12 FL (ref 8.9–12.7)
RBC # BLD AUTO: 3.27 MILLION/UL (ref 3.81–5.12)
WBC # BLD AUTO: 13.54 THOUSAND/UL (ref 4.31–10.16)

## 2018-09-12 PROCEDURE — 94762 N-INVAS EAR/PLS OXIMTRY CONT: CPT

## 2018-09-12 PROCEDURE — 85025 COMPLETE CBC W/AUTO DIFF WBC: CPT | Performed by: OBSTETRICS & GYNECOLOGY

## 2018-09-12 RX ADMIN — ROPIVACAINE HYDROCHLORIDE: 2 INJECTION, SOLUTION EPIDURAL; INFILTRATION at 07:09

## 2018-09-12 RX ADMIN — SODIUM CHLORIDE, SODIUM LACTATE, POTASSIUM CHLORIDE, AND CALCIUM CHLORIDE 125 ML/HR: .6; .31; .03; .02 INJECTION, SOLUTION INTRAVENOUS at 04:04

## 2018-09-12 RX ADMIN — ROPIVACAINE HYDROCHLORIDE: 5 INJECTION, SOLUTION EPIDURAL; INFILTRATION; PERINEURAL at 18:14

## 2018-09-12 RX ADMIN — SODIUM CHLORIDE, SODIUM LACTATE, POTASSIUM CHLORIDE, AND CALCIUM CHLORIDE 125 ML/HR: .6; .31; .03; .02 INJECTION, SOLUTION INTRAVENOUS at 11:56

## 2018-09-12 RX ADMIN — METHADONE HYDROCHLORIDE 50 MG: 10 TABLET ORAL at 18:11

## 2018-09-12 NOTE — LACTATION NOTE
This note was copied from a baby's chart  Encouraged Kimberlee to pump every three hours to protect supply until her son can be further evaluated for oral anomalies  Elijah Reynolds may opt for a nipple shield until evaluation done for once milk comes in more fluidly  Offer breast first, feed with pumped milk as available, pump for next feeding  Encouraged MOB to call for assistance, questions, and concerns about breastfeeding  Extension provided  Encouraged Elijah Reynolds to call for assistance, questions, and concerns about breastfeeding  Extension provided

## 2018-09-12 NOTE — CONSULTS
Anesthesia Progress Note - Epidural Pain Management    Jacqlyn Day MRN: [id number]  Unit/Bed#: -01 Encounter: [id number]    SURGERY DATE: 2018  Post-Op Diagnosis Codes:     * Breech presentation, single or unspecified fetus [O32 1XX0]    Assessment:   29 y o  female STATUS POST   Procedure(s):   SECTION ()  POD# 1    Plan:   1) Bolus epidural with 0 125% bupivacaine and 100mcg clonidine  2) Change PCEA infusion to ropi/fenatnyl at: CI 8cc/hr, DD 5cc q10m, 3doses/hr  3) Encourage PCEA use over PCA since she is not getting much relief from hydromorphone  4) Plan to d/c epidural tomorrow or Friday    Please call Saturnino Perea ( [facility] [phone]) with any questions    Subjective:  Current pain location(s): Lower abdomen L>R  Pain Scale:   6/10  Patient states that pain is steady and will oscillate from one side to the other depending on her position  She does not get much relief from her PCA boluses and has not been pushing her PCEA bolus button because she was confused to how the buttons worked      Meds/Allergies     Allergies   Allergen Reactions    Hydrocodone Other (See Comments)     Was told by her mom from when she was younger  Pt says mom told her she was allergic    Ketorolac Myalgia    Thorazine [Chlorpromazine] Seizures    Vicodin [Hydrocodone-Acetaminophen] Other (See Comments)     Unknown reaction, her mom told her she was allergic    Hydroxyzine Nausea Only and Vomiting       Objective     Temp:  [97 4 °F (36 3 °C)-99 7 °F (37 6 °C)] 98 3 °F (36 8 °C)  HR:  [] 71  Resp:  [16-20] 18  BP: ()/(56-81) 116/70    Physical Exam:  Gen: Well appearing, NAD  CV: RRR  Pulm: CTAB  Neuro: No focal deficits  Epidural Site: Catheter in good position, not tender to palpation, site clean    SIGNATURE: Mao Simpson MD  DATE: 2018  TIME: 9:29 AM

## 2018-09-12 NOTE — PROGRESS NOTES
Progress Note - OB/GYN   Zeny Silverman 29 y o  female MRN: [id number]  Unit/Bed#: -01 Encounter: [id number]      Zeny Silverman is a patient of Dr Bucky Young:  Post operative day #1 s/p 1LTCS for breech, stable, and doing well today  She has her epidural in place and has had 15 attempts overnight of the dilaudid PCA of which 12 were given  Plan:  1  Hx of heroin use   - Continue Methadone 50mg daily at 1600   - UDS positive for methadone only   - CM consulted   - Goes to [employer] (counselor - Deborah Coreas; [phone])   - F/u hepatitis C (in process)  2  Post-operative pain control   - Epidural managed by acute pain service   - Dilaudid PCA 0 1mg q10min; 15 attempts/12 given overnight   - Pain at worse was a 7/10 overnight, 5/10 this morning  3  Hemodynamically stable   - Pre-op Hb 10 2 --> post-op Hb 8 7   - Vitals WNL, currently asymptomatic  4  Tobacco use   - Pt declined nicoderm patch in-patient currently  5  Rodriguez catheter   - To remain in place until epidural comes out s/p 24h (~1030 removal today)   - Making adequate urine output, approximately 300cc hour   - F/u voiding trial today after rodriguez removed  6  Continue routine post partum care   - Encourage ambulation   - Encourage breastfeeding  7  Continue current meds   - See list below   - Pain adequately controlled with PO analgesics  8    Disposition   - Stable   - Anticipate discharge home POD#4 as  will be observed for LATOSHA up to 5d    Subjective/Objective     Chief Complaint:     Post operative day #1 from a 1LTCS with complaint of 5/10 pain today    Subjective:     Pain: yes  Tolerating Oral Intake: yes  Voiding: yes  Flatus: yes  Bowel Movement: no  Ambulating: no  Breastfeeding: Bottle feeding  Chest Pain: no  Shortness of Breath: no  Leg Pain/Discomfort: no  Lochia: minimal    Objective:   Vitals: /75   Pulse 64   Temp 98 2 °F (36 8 °C) (Oral)   Resp 16   Ht 4' 10" (1 473 m)   Wt 49 4 kg (109 lb) LMP 12/12/2017 (Approximate)   SpO2 97%   Breastfeeding? Yes   BMI 22 78 kg/m²       Intake/Output Summary (Last 24 hours) at 09/12/18 0612  Last data filed at 09/12/18 0556   Gross per 24 hour   Intake          3832  95 ml   Output             3850 ml   Net           -17 05 ml       Lab Results   Component Value Date    WBC 13 54 (H) 09/12/2018    HGB 8 7 (L) 09/12/2018    HCT 28 8 (L) 09/12/2018    MCV 88 09/12/2018     09/12/2018       Meds/Allergies     Physical Exam:  General: in no apparent distress, well developed and well nourished and non-toxic  Cardiovascular: Cor RRR  Lungs: clear to auscultation bilaterally  Abdomen: abdomen is soft without significant tenderness, masses, organomegaly or guarding; incision c/d/i closed with staples  Fundus: Firm and tender to palpation, 1 cm above the umbilicus  Lower extremeties: nontender, SCDs in place bilaterally    Labs/Tests:   Recent Results (from the past 24 hour(s))   Type and screen    Collection Time: 09/11/18  7:11 AM   Result Value Ref Range    ABO Grouping A     Rh Factor Positive     Antibody Screen Negative     Specimen Expiration Date [id number]    CBC    Collection Time: 09/11/18  7:11 AM   Result Value Ref Range    WBC 11 84 (H) 4 31 - 10 16 Thousand/uL    RBC 3 71 (L) 3 81 - 5 12 Million/uL    Hemoglobin 10 2 (L) 11 5 - 15 4 g/dL    Hematocrit 32 3 (L) 34 8 - 46 1 %    MCV 87 82 - 98 fL    MCH 27 5 26 8 - 34 3 pg    MCHC 31 6 31 4 - 37 4 g/dL    RDW 14 0 11 6 - 15 1 %    Platelets 781 205 - 118 Thousands/uL    MPV 11 9 8 9 - 12 7 fL   RPR    Collection Time: 09/11/18  7:11 AM   Result Value Ref Range    RPR Non-Reactive Non-Reactive   Rapid drug screen, urine    Collection Time: 09/11/18  7:12 AM   Result Value Ref Range    Amph/Meth UR Negative Negative    Barbiturate Ur Negative Negative    Benzodiazepine Urine Negative Negative    Cocaine Urine Negative Negative    Methadone Urine Positive (A) Negative    Opiate Urine Negative Negative PCP Ur Negative Negative    THC Urine Negative Negative   Blood gas, venous, cord    Collection Time: 09/11/18 10:19 AM   Result Value Ref Range    pH, Cord Eric 7 342 7 190 - 7 490    pCO2, Cord Eric 44 1 (H) 27 0 - 43 0 mm HG    pO2, Cord Erci 21 7 15 0 - 45 0 mm HG    HCO3, Cord Eric 23 4 12 2 - 28 6 mmol/L    Base Exc, Cord Eric -2 5 (L) 1 0 - 9 0 mmol/L    O2 Cont, Cord Eric 11 2 mL/dL    O2 HGB,VENOUS CORD 50 9 %   Blood gas, arterial, cord    Collection Time: 09/11/18 10:19 AM   Result Value Ref Range    pH, Cord Art 7 263 7 230 - 7 430    pCO2, Cord Art 56 0 30 0 - 60 0    pO2, Cord Art <10 0 5 0 - 25 0 mm HG    HCO3, Cord Art 24 7 17 3 - 27 3 mmol/L    Base Exc, Cord Art -3 2 (L) 3 0 - 11 0 mmol/L    O2 Content, Cord Art  ml/dl    O2 Hgb, Arterial Cord 12 9 %   CBC and differential    Collection Time: 09/12/18  5:22 AM   Result Value Ref Range    WBC 13 54 (H) 4 31 - 10 16 Thousand/uL    RBC 3 27 (L) 3 81 - 5 12 Million/uL    Hemoglobin 8 7 (L) 11 5 - 15 4 g/dL    Hematocrit 28 8 (L) 34 8 - 46 1 %    MCV 88 82 - 98 fL    MCH 26 6 (L) 26 8 - 34 3 pg    MCHC 30 2 (L) 31 4 - 37 4 g/dL    RDW 14 0 11 6 - 15 1 %    MPV 12 0 8 9 - 12 7 fL    Platelets 631 357 - 114 Thousands/uL    nRBC 0 /100 WBCs    Neutrophils Relative 62 43 - 75 %    Immat GRANS % 1 0 - 2 %    Lymphocytes Relative 30 14 - 44 %    Monocytes Relative 6 4 - 12 %    Eosinophils Relative 1 0 - 6 %    Basophils Relative 0 0 - 1 %    Neutrophils Absolute 8 49 (H) 1 85 - 7 62 Thousands/µL    Immature Grans Absolute 0 10 0 00 - 0 20 Thousand/uL    Lymphocytes Absolute 4 00 0 60 - 4 47 Thousands/µL    Monocytes Absolute 0 85 0 17 - 1 22 Thousand/µL    Eosinophils Absolute 0 07 0 00 - 0 61 Thousand/µL    Basophils Absolute 0 03 0 00 - 0 10 Thousands/µL       MEDS:   Current Facility-Administered Medications   Medication Dose Route Frequency    acetaminophen (TYLENOL) tablet 650 mg  650 mg Oral Q4H PRN    albuterol (PROVENTIL HFA,VENTOLIN HFA) inhaler 2 puff 2 puff Inhalation Q4H PRN    calcium carbonate (TUMS) chewable tablet 1,000 mg  1,000 mg Oral Daily PRN    diphenhydrAMINE (BENADRYL) injection 25 mg  25 mg Intravenous Q6H PRN    docusate sodium (COLACE) capsule 100 mg  100 mg Oral Daily PRN    HYDROmorphone (DILAUDID) 1 mg/mL 50 mL PCA   Intravenous Continuous    HYDROmorphone (DILAUDID) injection 0 5 mg  0 5 mg Intravenous Q5 Min PRN    lactated ringers infusion  125 mL/hr Intravenous Continuous    lactated ringers infusion  125 mL/hr Intravenous Continuous    methadone (DOLOPHINE) tablet 50 mg  50 mg Oral Daily    methadone (DOLOPHINE) tablet 50 mg  50 mg Oral Q24H LOUISE    metoclopramide (REGLAN) injection 10 mg  10 mg Intravenous Once PRN    metoclopramide (REGLAN) injection 5 mg  5 mg Intravenous Q6H PRN    naloxone (NARCAN) 0 04 mg/mL syringe 0 04 mg  0 04 mg Intravenous Q1MIN PRN    ondansetron (ZOFRAN) injection 4 mg  4 mg Intravenous Q8H PRN    ondansetron (ZOFRAN) injection 4 mg  4 mg Intravenous Q4H PRN    prenatal multivitamin tablet 1 tablet  1 tablet Oral Daily    ropivacaine 0 2% PCEA   Epidural Continuous    simethicone (MYLICON) chewable tablet 80 mg  80 mg Oral 4x Daily PRN     Invasive Devices     Peripheral Intravenous Line            Peripheral IV 09/11/18 Right Forearm less than 1 day          Epidural Line            Epidural Catheter 09/11/18 less than 1 day          Drain            Urethral Catheter Non-latex; Double-lumen 16 Fr  less than 1 day                Sara Woodruff DO  PGY-2 OB/GYN   9/12/2018 6:12 AM

## 2018-09-13 LAB — HBV SURFACE AB SER-ACNC: 139.3 MIU/ML

## 2018-09-13 PROCEDURE — 86706 HEP B SURFACE ANTIBODY: CPT | Performed by: OBSTETRICS & GYNECOLOGY

## 2018-09-13 PROCEDURE — 87522 HEPATITIS C REVRS TRNSCRPJ: CPT | Performed by: OBSTETRICS & GYNECOLOGY

## 2018-09-13 RX ORDER — OXYCODONE HYDROCHLORIDE 5 MG/1
5 TABLET ORAL EVERY 4 HOURS
Status: DISCONTINUED | OUTPATIENT
Start: 2018-09-13 | End: 2018-09-15 | Stop reason: HOSPADM

## 2018-09-13 RX ORDER — OXYCODONE HYDROCHLORIDE 10 MG/1
10 TABLET ORAL EVERY 6 HOURS PRN
Status: DISCONTINUED | OUTPATIENT
Start: 2018-09-13 | End: 2018-09-15 | Stop reason: HOSPADM

## 2018-09-13 RX ORDER — ACETAMINOPHEN 325 MG/1
650 TABLET ORAL EVERY 6 HOURS
Status: DISCONTINUED | OUTPATIENT
Start: 2018-09-13 | End: 2018-09-15 | Stop reason: HOSPADM

## 2018-09-13 RX ADMIN — ACETAMINOPHEN 650 MG: 325 TABLET, FILM COATED ORAL at 10:07

## 2018-09-13 RX ADMIN — OXYCODONE HYDROCHLORIDE 5 MG: 5 TABLET ORAL at 17:44

## 2018-09-13 RX ADMIN — Medication 1 TABLET: at 08:45

## 2018-09-13 RX ADMIN — OXYCODONE HYDROCHLORIDE 5 MG: 5 TABLET ORAL at 21:28

## 2018-09-13 RX ADMIN — METHADONE HYDROCHLORIDE 50 MG: 10 TABLET ORAL at 08:44

## 2018-09-13 RX ADMIN — ACETAMINOPHEN 650 MG: 325 TABLET, FILM COATED ORAL at 15:21

## 2018-09-13 RX ADMIN — ACETAMINOPHEN 650 MG: 325 TABLET, FILM COATED ORAL at 21:28

## 2018-09-13 NOTE — PROGRESS NOTES
Progress Note - Acute Pain Service Regional Follow Up  Cj Youngblood 29 y o  female MRN: [id number]  Unit/Bed#: -01 Encounter: [id number]      SURGERY DATE: 9/11/2018  Post-Op Diagnosis Codes:     * Breech presentation, single or unspecified fetus [O32 1XX0]    Pertinent labs and anticoagulants reviewed  Epidural catheter removed and catheter tip intact  Site of epidural clean, dry without erythema or pus  APS sign off  Thank you for the Consult  Please call  / [phone] ( [facility] [phone]) with any further questions    Physical Exam   Constitutional: No distress     Sitting up breastfeeding

## 2018-09-13 NOTE — ADDENDUM NOTE
Addendum  created 09/13/18 0919 by Emy Luciano MD    Anesthesia Intra LDAs edited, LDA properties accepted, Sign clinical note

## 2018-09-13 NOTE — SOCIAL WORK
Consults for breast pump, "hx of heroin use, on [facility]adone [facility]mg daily ([facility]), UDS + only for methadone," and "Mom on methadone for heroin use  Last used heroin 7/18/18"    Per chart, mom and baby UDS + methadone only  Met with pt ([phone]) and FOB/SO Felicita Wade ([phone]) to introduce CM services and address consults  Pt verbalized agreement with personal interview with FOB present  Pt and FOB report they are doing well and baby boy Davon Austin is 1st kid for couple who live together in [facility] ([address])  Pt reports she uses her mom's [place] address for mail because [place] is so small they do not deliver mail directly to the homes and she is not interested in a PO box  Pt and FOB report they have support from family, all baby supplies needed except breast pump, they are employed, and family has car for transportation as needed  Pt admits to heroin addiction and use during pregnancy with last use in July 2018  Pt reports she sought treatment at [address] [place] [address] and has been on methadone since  Pt reports she has supportive counselor at [address] [place] [address] and was aware to expect C&Y referral  Discussed need for C&Y referral and the process  All questions answered  Pt and FOB are aware to anticipate follow up likely in hospital  Also discussed options for breast pump with pt  Per pt request, Ameda pump ordered from [facility] via [facility] for room delivery tomorrow  Pt and FOB deny any other CM needs at this time  Encouraged family to contact CM as needed  Child Line referral submitted online via Child Welfare Portal with email confirmation of receipt of referral (e-Referral ID: [id number])  Copies of referral placed in medical records basket for scanning into mom and baby EPIC charts  Copy also filed in main CM office  Awaiting response from [facility] C&Y [phone]  Pt is OK for d/c but need clearance from C&Y for baby d/c

## 2018-09-13 NOTE — PROGRESS NOTES
Progress Note - OB/GYN   Opal Good 29 y o  female MRN: [id number]  Unit/Bed#: -01 Encounter: [id number]      Opal Good is a patient of Dr Carol Peralta     Assessment:  Post operative day #2 s/p 1LTCS for breech, stable, and doing well today  Her PCA was d/c'd as she wasn't getting that much relief from the hydromorphone  Instead, acute pain opted for a PCEA  Overnight she says she has burning at the incision site but is otherwise ok  Her rodriguez is still in place and making copious amount of clear yellow urine      Plan:  1  Hx of heroin use              - Continue Methadone 50mg daily at 1600              - UDS positive for methadone only              - CM consulted, f/u consult              - Goes to [facility] (counselor - Robert Shipman; [phone])              - Hepatitis C positive, discussed with patient today; pt aware as of  with Dr Carol Peralta  2  Post-operative pain control              - Switch to PCEA pump, plan to transition to oral scheduled roxicodone and tylenol today   - Pt agreeable  3  Hemodynamically stable              - Pre-op Hb 10 2 --> post-op Hb 8 7              - Vitals WNL, currently asymptomatic  4  Tobacco use              - Pt declined nicoderm patch in-patient currently  5  Rodriguez catheter              - To remain in place until epidural comes out later today              - Making adequate urine output, approximately 200cc hour              - F/u voiding trial after rodriguez removed  6  Continue routine post partum care              - Encourage ambulation              - Encourage breastfeeding  7  Continue current meds              - See list below              - Pain adequately controlled with PO analgesics  8    Disposition              - Stable              - Anticipate discharge home POD#4 as  will be observed for LATOSHA up to 5d    Subjective/Objective     Chief Complaint:     Post operative day #2 from a 1LTCS with no complaints today    Subjective: Pain: yes  Tolerating Oral Intake: yes  Voiding: yes  Flatus: yes  Bowel Movement: no  Ambulating: no  Breastfeeding: Using breast pump  Chest Pain: no  Shortness of Breath: no  Leg Pain/Discomfort: no  Lochia: minimal    Objective:   Vitals: /62 (BP Location: Right arm)   Pulse 56   Temp 98 1 °F (36 7 °C) (Oral)   Resp 18   Ht 4' 10" (1 473 m)   Wt 49 4 kg (109 lb)   LMP 12/12/2017 (Approximate)   SpO2 98%   Breastfeeding? Yes   BMI 22 78 kg/m²       Intake/Output Summary (Last 24 hours) at 09/13/18 0618  Last data filed at 09/12/18 2025   Gross per 24 hour   Intake          1369 15 ml   Output             4350 ml   Net         -2980 85 ml       Lab Results   Component Value Date    WBC 13 54 (H) 09/12/2018    HGB 8 7 (L) 09/12/2018    HCT 28 8 (L) 09/12/2018    MCV 88 09/12/2018     09/12/2018       Meds/Allergies     Physical Exam:  General: in no apparent distress and well developed and well nourished  Cardiovascular: Cor RRR  Lungs: clear to auscultation bilaterally  Abdomen: abdomen is soft without significant tenderness, masses, organomegaly or guarding; incision c/d/i closed with staples  Fundus: Firm and non-tender, 1 cm above the umbilicus  Lower extremeties: nontender, SCDs in place bilaterally    Labs/Tests:   No results found for this or any previous visit (from the past 24 hour(s))      MEDS:   Current Facility-Administered Medications   Medication Dose Route Frequency    acetaminophen (TYLENOL) tablet 650 mg  650 mg Oral Q4H PRN    albuterol (PROVENTIL HFA,VENTOLIN HFA) inhaler 2 puff  2 puff Inhalation Q4H PRN    calcium carbonate (TUMS) chewable tablet 1,000 mg  1,000 mg Oral Daily PRN    diphenhydrAMINE (BENADRYL) injection 25 mg  25 mg Intravenous Q6H PRN    docusate sodium (COLACE) capsule 100 mg  100 mg Oral Daily PRN    HYDROmorphone (DILAUDID) 1 mg/mL 50 mL PCA   Intravenous Continuous    methadone (DOLOPHINE) tablet 50 mg  50 mg Oral Daily    methadone (DOLOPHINE) tablet 50 mg  50 mg Oral Q24H LOUISE    naloxone (NARCAN) 0 04 mg/mL syringe 0 04 mg  0 04 mg Intravenous Q1MIN PRN    ondansetron (ZOFRAN) injection 4 mg  4 mg Intravenous Q8H PRN    prenatal multivitamin tablet 1 tablet  1 tablet Oral Daily    ropivacaine 0 1% and fentaNYL 2 mcg/mL PCEA   Epidural Continuous    simethicone (MYLICON) chewable tablet 80 mg  80 mg Oral 4x Daily PRN     Invasive Devices     Epidural Line            Epidural Catheter 09/11/18 1 day          Drain            Urethral Catheter Non-latex; Double-lumen 16 Fr  1 day                Perry Garcia DO  PGY-2 OB/GYN   9/13/2018 6:18 AM

## 2018-09-14 DIAGNOSIS — G89.18 POST-OP PAIN: Primary | ICD-10-CM

## 2018-09-14 RX ORDER — ACETAMINOPHEN 325 MG/1
TABLET ORAL
Qty: 30 TABLET | Refills: 0 | Status: SHIPPED | OUTPATIENT
Start: 2018-09-14

## 2018-09-14 RX ORDER — SIMETHICONE 80 MG
80 TABLET,CHEWABLE ORAL 4 TIMES DAILY PRN
Qty: 30 TABLET | Refills: 0 | Status: SHIPPED | OUTPATIENT
Start: 2018-09-14

## 2018-09-14 RX ORDER — IBUPROFEN 600 MG/1
600 TABLET ORAL EVERY 6 HOURS PRN
Qty: 30 TABLET | Refills: 0 | Status: SHIPPED | OUTPATIENT
Start: 2018-09-14

## 2018-09-14 RX ORDER — DOCUSATE SODIUM 100 MG/1
100 CAPSULE, LIQUID FILLED ORAL DAILY PRN
Qty: 10 CAPSULE | Refills: 0 | Status: SHIPPED | OUTPATIENT
Start: 2018-09-14

## 2018-09-14 RX ORDER — IBUPROFEN 600 MG/1
600 TABLET ORAL EVERY 6 HOURS PRN
Status: DISCONTINUED | OUTPATIENT
Start: 2018-09-14 | End: 2018-09-15 | Stop reason: HOSPADM

## 2018-09-14 RX ORDER — CALCIUM CARBONATE 200(500)MG
1000 TABLET,CHEWABLE ORAL DAILY PRN
Refills: 0 | Status: SHIPPED | OUTPATIENT
Start: 2018-09-14

## 2018-09-14 RX ADMIN — OXYCODONE HYDROCHLORIDE 5 MG: 5 TABLET ORAL at 08:21

## 2018-09-14 RX ADMIN — OXYCODONE HYDROCHLORIDE 5 MG: 5 TABLET ORAL at 01:03

## 2018-09-14 RX ADMIN — ACETAMINOPHEN 650 MG: 325 TABLET, FILM COATED ORAL at 16:30

## 2018-09-14 RX ADMIN — ACETAMINOPHEN 650 MG: 325 TABLET, FILM COATED ORAL at 22:11

## 2018-09-14 RX ADMIN — ACETAMINOPHEN 650 MG: 325 TABLET, FILM COATED ORAL at 03:29

## 2018-09-14 RX ADMIN — OXYCODONE HYDROCHLORIDE 5 MG: 5 TABLET ORAL at 16:29

## 2018-09-14 RX ADMIN — METHADONE HYDROCHLORIDE 50 MG: 10 TABLET ORAL at 08:21

## 2018-09-14 RX ADMIN — OXYCODONE HYDROCHLORIDE 5 MG: 5 TABLET ORAL at 22:10

## 2018-09-14 RX ADMIN — Medication 1 TABLET: at 08:21

## 2018-09-14 RX ADMIN — OXYCODONE HYDROCHLORIDE 5 MG: 5 TABLET ORAL at 12:19

## 2018-09-14 NOTE — PROGRESS NOTES
Postop day 3  S     patient states her pain is better, under control with oral meds  Her infant is doing well  The infant will stay in the nursery for another 2-3 days  They are looking for signs of withdrawal   The patient would like to go home tomorrow  She she will continue to take her methadone as an outpatient basis after discharge      O    her vital signs are stable  Her abdomen is soft positive bowel sounds  Her incision is intact  She is voiding well  She admits to positive flatus  A/P    SHE WILL REPEAT CONTINUE HER POSTOPERATIVE CARE  SHE SHE WILL BE ALLOWED TO GET A SHOWER  WE ANTICIPATE DISCHARGE TOMORROW  THE PATIENT WAS REMINDED HOW IMPORTANT FOR HER TO FOLLOW HER OUTPATIENT CARE  WE HAD A DISCUSSION ABOUT HER ADDICTION  IT IS UNCLEAR WHETHER NOT THE INFANT WILL BE ALLOWED TO GO HOME WITH HER  SHE DOES HAVE SUPPORT WITH HER MOTHER  PROBABLE DISCHARGE TOMORROW ON ORAL MEDICATION

## 2018-09-14 NOTE — SOCIAL WORK
Rec'd call from [facility] C&Y worker Abdulaziz Mcdonough advising she rec'd case this afternoon and will be coming to hospital to start assessment process with family  Kimberly Chacon aware of baby's possible d/c Sunday 9/16  NEED C&Y CLEARANCE FOR BABY D/C

## 2018-09-14 NOTE — DISCHARGE INSTR - AVS FIRST PAGE
Marcelo Bass was seen at [address] [place] [address] from 910/18 to 9/15/18 for necessary in-patient maternity care  Please see our records or call [phone] for any further questions   Thank you

## 2018-09-14 NOTE — PLAN OF CARE
DISCHARGE PLANNING     Discharge to home or other facility with appropriate resources Progressing        DISCHARGE PLANNING - CARE MANAGEMENT     Discharge to post-acute care or home with appropriate resources Progressing        INFECTION - ADULT     Absence or prevention of progression during hospitalization Progressing     Absence of fever/infection during neutropenic period Progressing        Knowledge Deficit     Patient/family/caregiver demonstrates understanding of disease process, treatment plan, medications, and discharge instructions Progressing        PAIN - ADULT     Verbalizes/displays adequate comfort level or baseline comfort level Progressing        POSTPARTUM     Experiences normal postpartum course Progressing     Appropriate maternal -  bonding Progressing     Establishment of infant feeding pattern Progressing     Incision(s), wounds(s) or drain site(s) healing without S/S of infection Progressing        Potential for Falls     Patient will remain free of falls Progressing        SAFETY ADULT     Maintain or return to baseline ADL function Progressing     Maintain or return mobility status to optimal level Progressing

## 2018-09-14 NOTE — LACTATION NOTE
This note was copied from a baby's chart  Mom requesting formula supplementation  Meeting with social work  [employer] MOB and FOB to call for assistance, questions and concerns  Extension number for inpatient lactation support provided

## 2018-09-15 VITALS
HEART RATE: 57 BPM | HEIGHT: 58 IN | DIASTOLIC BLOOD PRESSURE: 75 MMHG | BODY MASS INDEX: 22.88 KG/M2 | WEIGHT: 109 LBS | OXYGEN SATURATION: 98 % | RESPIRATION RATE: 18 BRPM | SYSTOLIC BLOOD PRESSURE: 154 MMHG | TEMPERATURE: 98.8 F

## 2018-09-15 RX ADMIN — IBUPROFEN 600 MG: 600 TABLET, FILM COATED ORAL at 09:35

## 2018-09-15 RX ADMIN — DOCUSATE SODIUM 100 MG: 100 CAPSULE, LIQUID FILLED ORAL at 09:35

## 2018-09-15 RX ADMIN — Medication 1 TABLET: at 09:35

## 2018-09-15 RX ADMIN — METHADONE HYDROCHLORIDE 50 MG: 10 TABLET ORAL at 09:35

## 2018-09-15 NOTE — CASE MANAGEMENT
Notification of Maternity Inpatient Admission/Maternity Inpatient Authorization Request  This is a Notification of Maternity Inpatient Admission/Maternity Inpatient Authorization Request to our facility [address] [place] [address]  Please be advised that this patient is currently in our facility under Inpatient Status  Below you will find the Birth/ Summary, Attending Physician and Facilitys information including NPI#  and contact information for the Utilization Review Department where the patient is receiving care services  Place of Service Code: 24   Place of Service Name: Inpatient Hospital  Presentation Date & Time: 2018  6:06 AM  Inpatient Admission Date & Time: 18 7266  Discharge Date & Time: No discharge date for patient encounter  Discharge Disposition (if discharged): No discharge date for patient encounter  Attending Physician & NPI: Rosetta Berry Md [[id number]]  Mother's Admitting Diagnosis: Breech presentation, single or unspecified fetus [O32 1XX0]  45 weeks gestation of pregnancy [Z3A 38]  Type of Delivery: Diagnosis: O82  DELIVERY  Estimated Date of Delivery: 18    Mother of [place] Information: Sylvia Pittman   MRN: [id number] YOB: 1990     Information:   Information for the patient's :  Salomón Mckeon [[id number]]    Delivery Information:  Sex: male Delivered 2018 10:18 AM by , Low Transverse; Gestational Age: 36w0d   Measurements: Weight: 7 lb 4 oz (3289 g);   Height: 18"  APGAR 1 minute 5 minutes 10 minutes   Totals: 9 9      OB History      Para Term  AB Living    4 1 1 0 3 1    SAB TAB Ectopic Multiple Live Births    1 2 0 0 1      Facility: [address] [place] [address])  Address: [facility] Manolo Boss [address]  Phone: [phone] Tax ID: [id number]  NPI: [id number]  Medicare ID: [id number]  Thank you,  [address] [facility]  Network Utilization Review Department  Phone: [phone]; Fax [phone]  ATTENTION: Please call with any questions or concerns to [phone]  and carefully follow the prompts so that you are directed to the right person  Send all requests for admission clinical reviews, approved or denied determinations and any other requests to fax [phone]   All voicemails are confidential

## 2018-09-15 NOTE — PLAN OF CARE
DISCHARGE PLANNING     Discharge to home or other facility with appropriate resources Adequate for Discharge        DISCHARGE PLANNING - CARE MANAGEMENT     Discharge to post-acute care or home with appropriate resources Adequate for Discharge        INFECTION - ADULT     Absence or prevention of progression during hospitalization Adequate for Discharge     Absence of fever/infection during neutropenic period Adequate for Discharge        Knowledge Deficit     Patient/family/caregiver demonstrates understanding of disease process, treatment plan, medications, and discharge instructions Adequate for Discharge        PAIN - ADULT     Verbalizes/displays adequate comfort level or baseline comfort level Adequate for Discharge        POSTPARTUM     Experiences normal postpartum course Adequate for Discharge     Appropriate maternal -  bonding Adequate for Discharge     Establishment of infant feeding pattern Adequate for Discharge     Incision(s), wounds(s) or drain site(s) healing without S/S of infection Adequate for Discharge        Potential for Falls     Patient will remain free of falls Adequate for Discharge        SAFETY ADULT     Maintain or return to baseline ADL function Adequate for Discharge     Maintain or return mobility status to optimal level Adequate for Discharge

## 2018-09-17 LAB
HCV RNA SERPL NAA+PROBE-ACNC: NORMAL IU/ML
HCV RNA SERPL NAA+PROBE-LOG IU: 6.24 LOG10 IU/ML
TEST INFORMATION: NORMAL

## 2018-09-21 ENCOUNTER — POSTPARTUM VISIT (OUTPATIENT)
Dept: OBGYN CLINIC | Facility: CLINIC | Age: 28
End: 2018-09-21
Payer: COMMERCIAL

## 2018-09-21 VITALS
WEIGHT: 95.8 LBS | BODY MASS INDEX: 20.11 KG/M2 | HEIGHT: 58 IN | DIASTOLIC BLOOD PRESSURE: 78 MMHG | SYSTOLIC BLOOD PRESSURE: 120 MMHG

## 2018-09-21 DIAGNOSIS — F19.10 HIGH RISK PREGNANCY DUE TO MATERNAL DRUG ABUSE IN THIRD TRIMESTER (HCC): ICD-10-CM

## 2018-09-21 DIAGNOSIS — O99.323 HIGH RISK PREGNANCY DUE TO MATERNAL DRUG ABUSE IN THIRD TRIMESTER (HCC): ICD-10-CM

## 2018-09-21 DIAGNOSIS — Z98.891 S/P PRIMARY LOW TRANSVERSE C-SECTION: ICD-10-CM

## 2018-09-21 PROCEDURE — 99213 OFFICE O/P EST LOW 20 MIN: CPT | Performed by: OBSTETRICS & GYNECOLOGY

## 2018-09-21 NOTE — PROGRESS NOTES
This is a 66-year-old white female, she is approximately 10 days status post primary  section for breech presentation  She has a history of being methadone dependent  Her current pain medications include Motrin  She is doing well  The she is bottle feeding the infant  She is hepatitis-C positive her incision looks great  There is no vaginal bleeding  She was instructed to make an appointment with the nurse in my office in 10 days  She will be allowed to drive after baby's [id number]  She is doing well on her maintenance with methadone    There is no evidence of postpartum depression at this time

## 2018-09-26 ENCOUNTER — OFFICE VISIT (OUTPATIENT)
Dept: POSTPARTUM | Facility: CLINIC | Age: 28
End: 2018-09-26

## 2018-09-26 VITALS — SYSTOLIC BLOOD PRESSURE: 117 MMHG | DIASTOLIC BLOOD PRESSURE: 68 MMHG

## 2018-09-26 DIAGNOSIS — O92.79 NURSING DIFFICULTY: ICD-10-CM

## 2018-09-26 LAB — PLACENTA IN STORAGE: NORMAL

## 2018-09-26 NOTE — PROGRESS NOTES
INITIAL BREAST FEEDING EVALUATION    Informant/Relationship: Kimberlee/mom    Discussion of General Lactation Issues: Layne Miller was not originally planning to breastfeed, but was encouraged to do so in the hospital  However, Ewa Raymond really wouldn't latch, and she was successful getting him to the breast only a few times since he was born  Layne Miller was told that Ewa Raymond has a tongue tie and she was referred to Baby and Me with him  Infant is 3weeks old today   History:  Fertility Problem:no  Breast changes:unsure  : yes - vaginal  Full term:yes - FT   labor:no  First nursing/attempt < 1 hour after birth:No, it wasn't Ortiz's original feeding choice  Skin to skin following delivery:unknown  Breast changes after delivery:yes - around day 4, felt hernandez  Rooming in (infant in room with mother with exception of procedures, eg  Circumcision: baby was watched for signs of methadone withdrawal  Blood sugar issues:no  NICU stay:no  Jaundice:no  Phototherapy:no  Supplement given: (list supplement and method used as well as reason(s):yes - formula, this was Adelineeliseo's original feeding plan    Past Medical History:   Diagnosis Date    Asthma     rescue inhaler prn    Attention deficit disorder     [facility]     Epilepsy ([facility] )     no seizure since  - ? related to drug use - had EEG    IV drug abuse 2018    MRSA infection     10 years ago 2008    Seizures ([facility]     Spontaneous      Tachycardia     Varicella          Current Outpatient Prescriptions:     acetaminophen (TYLENOL) 325 mg tablet, Take 1-2 tablets every 4-6 hours for mild to moderate pain as needed  , Disp: 30 tablet, Rfl: 0    ALBUTEROL IN, Inhale as needed, Disp: , Rfl:     calcium carbonate (TUMS) 500 mg chewable tablet, Chew 2 tablets (1,000 mg total) daily as needed for indigestion or heartburn (Patient not taking: Reported on 2018 ), Disp: , Rfl: 0    docusate sodium (COLACE) 100 mg capsule, Take 1 capsule (100 mg total) by mouth daily as needed for constipation (Patient not taking: Reported on 9/21/2018 ), Disp: 10 capsule, Rfl: 0    ibuprofen (MOTRIN) 600 mg tablet, Take 1 tablet (600 mg total) by mouth every 6 (six) hours as needed for moderate pain (Patient not taking: Reported on 9/21/2018 ), Disp: 30 tablet, Rfl: 0    ibuprofen (MOTRIN) 600 mg tablet, Take 1 tablet (600 mg total) by mouth every 6 (six) hours as needed for mild pain (Patient not taking: Reported on 9/21/2018 ), Disp: 30 tablet, Rfl: 0    METHADONE HCL PO, Take 50 mg by mouth  , Disp: , Rfl:     Prenatal Vit-Fe Fumarate-FA (PRENATAL VITAMIN PO), Take by mouth, Disp: , Rfl:     simethicone (MYLICON) 80 mg chewable tablet, Chew 1 tablet (80 mg total) 4 (four) times a day as needed for flatulence (Patient not taking: Reported on 9/21/2018 ), Disp: 30 tablet, Rfl: 0    Allergies   Allergen Reactions    Hydrocodone Other (See Comments)     Was told by her mom from when she was younger  Pt says mom told her she was allergic    Ketorolac Myalgia    Thorazine [Chlorpromazine] Seizures    Vicodin [Hydrocodone-Acetaminophen] Other (See Comments)     Unknown reaction, her mom told her she was allergic    Hydroxyzine Nausea Only and Vomiting       History   Drug Use    Frequency: 70 0 times per week    Types: Heroin     Comment: HISTORY OF HEROINE USE AND METHAMPHETAMINES AS PER ALL SCRIPTS        Social History     Interval Breastfeeding History:    Frequency of breast feeding: only once or twice  Does mother feel breastfeeding is effective:  If no, explain: baby doesn't latch  Does infant appear satisfied after nursing:If no, explain: baby doesn't latch  Stooling pattern normal: lYes  Urinating frequently:Yes  Using shield or shells: No    Alternative/Artificial Feedings:   Bottle: Yes, for formula  Cup: N/A  Syringe/Finger: N/A           Formula Type: Similac                     Amount: 2 oz            Breast Milk: Amount: none            Frequency Q 3 Hr between feedings  Elimination Problems: No      Equipment:  Nipple Shield             Type: n/a             Size: n/a             Frequency of Use: n/a  Pump            Type: Medela PISA            Frequency of Use: 1-2 x/day  Shells            Type: n/a            Frequency of use: n/a    Equipment Problems: no    Mom:  Breast: Normal  Nipple Assessment in General: Normal: elongated/eraser, no discoloration and no damage noted  Mother's Awareness of Feeding Cues                 Recognizes: Yes                  Verbalizes: Yes  Support System: extended family  History of Breastfeeding: none  Changes/Stressors/Violence: h/o drug use; difficulty with latch; pain with latch  Concerns/Goals: Ciro Sewell thinks she would like to nurse, certainly until she returns to work  Problems with Mom: H/o drug use    Physical Exam   HENT:   Thin, appears older than stated age   Neck: Normal range of motion  Neck supple  No thyromegaly present  Cardiovascular: Normal rate, regular rhythm, normal heart sounds and intact distal pulses  No murmur heard  Pulmonary/Chest: Effort normal and breath sounds normal    Musculoskeletal: She exhibits no edema or tenderness  Lymphadenopathy:     She has no cervical adenopathy  She has no axillary adenopathy  Psychiatric: She has a normal mood and affect   Her behavior is normal  Judgment and thought content normal        Infant:  Behaviors: Alert  Color: Pink  Birth weight: 3 289kg  Current weight: 3 22kg    Problems with infant: tongue tie      General Appearance:  Alert, active, no distress                             Head:  Normocephalic, AFOF, sutures opposed                             Eyes:  Conjunctiva clear, no drainage                              Ears:  Normally placed, no anomolies                             Nose:  Septum intact, no drainage or erythema                           Mouth:  No lesions; thin frenulum extends almost to the tip of the tongue, limited lift, extension, and lateralization noted with slight dimpling in the tip  Neck:  Supple, symmetrical, trachea midline, no adenopathy                                  Respiratory:  No grunting, flaring, retractions, breath sounds clear and equal            Cardiovascular:  Regular rate and rhythm  No murmur  Adequate perfusion/capillary refill  Femoral pulse present                    Abdomen:   Soft, non-tender, no masses, bowel sounds present, no HSM             Genitourinary:  Normal male, testes descended, no discharge, swelling, or pain                              Spine:   No abnormalities noted           Musculoskeletal:  Full range of motion          Skin/Hair/Nails:   Skin warm, dry, and intact, no rashes or abnormal dyspigmentation or lesions                Neurologic:   No abnormal movement, tone appropriate for gestational age    [place] Latch:  Efficiency:               Lips Flanged: Yes, after frenotomy              Depth of latch: Good, after frenotomy              Audible Swallow: Yes, after frenotomy              Visible Milk: Yes, after frenotomy              Wide Open/ Asymmetrical: Yes, after frenotomy              Suck Swallow Cycle: Breathing: unlabored, Coordinated: yes  Nipple Assesment after latch: Normal: elongated/eraser, no discoloration and no damage noted  Latch Problems: Tongue tie, corrected with frenotomy    Position:  Infant's Ergonomics/Body               Body Alignment: Yes               Head Supported: Yes               Close to Mom's body/ Lifted/ Supported: Yes               Mom's Ergonomics/Body: Yes                           Supported:  Yes                           Sitting Back: Yes                           Brings Baby to her breast: Yes  Positioning Problems: None      Education:  Reviewed Latch: Reviewed how to gently compress the breast as if offering the baby a sandwich to improve the depth of his latch   Reviewed Positioning for Dyad: Reviewed how to gently position the baby so that his lower lip and chin touch the breast and his nose is just above the nipple to increase the width and the asymmetry of his latch  Reviewed Frequency/Supply & Demand: Discussed the importance of routine and frequent breast emptying  The baby should be nursed on demand or every 3 hours during the day and every 5 hours at night  Reviewed Alternative/Artificial Feedings: Discussed and demonstrated paced bottle feeding  Plan:  Discussed the history and physical with Kimberlee  Discussed the importance of frequent breast emptying, either by breastfeeding or by pumping in order to build and maintain milk production  Discussed the option of using an herbal or dietary supplement to increase prolactin and discussed dosing of moringa  Also discussed the physical findings on the baby consistent with tongue tie and reviewed how this can negatively impact breastfeeding  Discussed the risks and benefits of the frenotomy and performed the procedure  The baby then nursed better with increased comfort for [facility]  I have spent 45 minutes with Patient  today in which greater than 50% of this time was spent in counseling/coordination of care regarding Risks and benefits of tx options, Intructions for management, Patient and family education, Risk factor reductions and Impressions

## 2018-10-03 ENCOUNTER — POSTPARTUM VISIT (OUTPATIENT)
Dept: OBGYN CLINIC | Facility: CLINIC | Age: 28
End: 2018-10-03
Payer: COMMERCIAL

## 2018-10-03 VITALS
BODY MASS INDEX: 20.57 KG/M2 | HEIGHT: 58 IN | DIASTOLIC BLOOD PRESSURE: 68 MMHG | WEIGHT: 98 LBS | SYSTOLIC BLOOD PRESSURE: 118 MMHG

## 2018-10-03 DIAGNOSIS — Z98.891 S/P PRIMARY LOW TRANSVERSE C-SECTION: ICD-10-CM

## 2018-10-03 DIAGNOSIS — F19.10 HIGH RISK PREGNANCY DUE TO MATERNAL DRUG ABUSE IN THIRD TRIMESTER (HCC): ICD-10-CM

## 2018-10-03 DIAGNOSIS — O99.323 HIGH RISK PREGNANCY DUE TO MATERNAL DRUG ABUSE IN THIRD TRIMESTER (HCC): ICD-10-CM

## 2018-10-03 PROCEDURE — 99213 OFFICE O/P EST LOW 20 MIN: CPT | Performed by: OBSTETRICS & GYNECOLOGY

## 2018-10-03 NOTE — PROGRESS NOTES
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

## 2018-10-24 ENCOUNTER — POSTPARTUM VISIT (OUTPATIENT)
Dept: OBGYN CLINIC | Facility: CLINIC | Age: 28
End: 2018-10-24
Payer: COMMERCIAL

## 2018-10-24 VITALS
SYSTOLIC BLOOD PRESSURE: 120 MMHG | BODY MASS INDEX: 21.24 KG/M2 | WEIGHT: 101.2 LBS | DIASTOLIC BLOOD PRESSURE: 82 MMHG | HEIGHT: 58 IN

## 2018-10-24 DIAGNOSIS — Z30.011 ENCOUNTER FOR INITIAL PRESCRIPTION OF CONTRACEPTIVE PILLS: Primary | ICD-10-CM

## 2018-10-24 PROCEDURE — 99024 POSTOP FOLLOW-UP VISIT: CPT | Performed by: OBSTETRICS & GYNECOLOGY

## 2018-10-24 RX ORDER — NORGESTIMATE AND ETHINYL ESTRADIOL 0.25-0.035
1 KIT ORAL DAILY
Qty: 84 TABLET | Refills: 1 | Status: SHIPPED | OUTPATIENT
Start: 2018-10-24

## 2018-10-24 NOTE — PROGRESS NOTES
This is a 69-year-old white female now approximately 6 weeks status post primary  section for breech presentation  She is on methadone  She is doing well with that she is still smoking  At denies any problem with postpartum depression  She is bottle-feeding  Infant is doing well and thriving  Examination the breast and the abdomen and pelvic exam all within normal limits  It was noted that she was constipated  She was reminded to call take Colace and increase her water intake  She will be given a prescription for the birth control pill  She will start that at the onset of her next menses  She was reminded to use a backup method for the 1st month  She return my office in 3 weeks for re-evaluation

## 2018-10-24 NOTE — PATIENT INSTRUCTIONS
Doing well status post primary  section for breech  Will start on birth control pills  Use a backup method the 1st month  She return my office in 3 months for re-evaluation

## 2019-01-30 ENCOUNTER — APPOINTMENT (OUTPATIENT)
Dept: RADIOLOGY | Age: 29
End: 2019-01-30
Payer: COMMERCIAL

## 2019-01-30 ENCOUNTER — OFFICE VISIT (OUTPATIENT)
Dept: URGENT CARE | Age: 29
End: 2019-01-30
Payer: COMMERCIAL

## 2019-01-30 VITALS
TEMPERATURE: 98 F | WEIGHT: 95 LBS | OXYGEN SATURATION: 97 % | HEART RATE: 85 BPM | SYSTOLIC BLOOD PRESSURE: 129 MMHG | DIASTOLIC BLOOD PRESSURE: 80 MMHG | BODY MASS INDEX: 19.94 KG/M2 | RESPIRATION RATE: 18 BRPM | HEIGHT: 58 IN

## 2019-01-30 DIAGNOSIS — M54.2 NECK PAIN: ICD-10-CM

## 2019-01-30 DIAGNOSIS — S16.1XXA CERVICAL MUSCLE STRAIN, INITIAL ENCOUNTER: Primary | ICD-10-CM

## 2019-01-30 DIAGNOSIS — R68.84 JAW PAIN: ICD-10-CM

## 2019-01-30 PROCEDURE — 70110 X-RAY EXAM OF JAW 4/> VIEWS: CPT

## 2019-01-30 PROCEDURE — 72040 X-RAY EXAM NECK SPINE 2-3 VW: CPT

## 2019-01-30 PROCEDURE — G0382 LEV 3 HOSP TYPE B ED VISIT: HCPCS | Performed by: FAMILY MEDICINE

## 2019-01-30 NOTE — PATIENT INSTRUCTIONS
As we discussed you can go to ER for full evaluation of head injury  You declined and state your headache is improving  Xray of neck and mandible show no fracture  Rest   Continue with ice every 3-4 hours  You can use Tylenol or Motrin as needed for pain  Follow up with family doctor if no improvement  Go to the ER with any worsening headaches, confusion, dizziness, nausea, vomiting or any worsening neck pain  If he continues to have ear pain I would follow up with ENT

## 2019-01-30 NOTE — LETTER
January 30, 2019     Patient: Aiyana Caro   YOB: 1990   Date of Visit: 1/30/2019       To Whom It May Concern: It is my medical opinion that Aiyana Caro may return to work on 01/31/2019  If you have any questions or concerns, please don't hesitate to call           Sincerely,        BELLO Craig    CC: No Recipients
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
## 2020-06-15 ENCOUNTER — APPOINTMENT (EMERGENCY)
Dept: ULTRASOUND IMAGING | Facility: HOSPITAL | Age: 30
End: 2020-06-15
Payer: COMMERCIAL

## 2020-06-15 ENCOUNTER — HOSPITAL ENCOUNTER (EMERGENCY)
Facility: HOSPITAL | Age: 30
Discharge: HOME | End: 2020-06-15
Attending: EMERGENCY MEDICINE | Admitting: EMERGENCY MEDICINE
Payer: COMMERCIAL

## 2020-06-15 VITALS
TEMPERATURE: 99.1 F | OXYGEN SATURATION: 99 % | HEART RATE: 90 BPM | RESPIRATION RATE: 16 BRPM | WEIGHT: 95.46 LBS | SYSTOLIC BLOOD PRESSURE: 105 MMHG | BODY MASS INDEX: 19.95 KG/M2 | DIASTOLIC BLOOD PRESSURE: 63 MMHG

## 2020-06-15 DIAGNOSIS — J20.9 ACUTE BRONCHITIS: ICD-10-CM

## 2020-06-15 DIAGNOSIS — R07.9 CHEST PAIN: Primary | ICD-10-CM

## 2020-06-15 LAB
ALBUMIN SERPL BCP-MCNC: 4.2 G/DL (ref 3.5–5)
ALP SERPL-CCNC: 85 U/L (ref 46–116)
ALT SERPL W P-5'-P-CCNC: 32 U/L (ref 12–78)
ANION GAP SERPL CALCULATED.3IONS-SCNC: 8 MMOL/L (ref 4–13)
APTT PPP: 26 SECONDS (ref 23–37)
AST SERPL W P-5'-P-CCNC: 17 U/L (ref 5–45)
BASOPHILS # BLD AUTO: 0.06 THOUSANDS/ΜL (ref 0–0.1)
BASOPHILS NFR BLD AUTO: 1 % (ref 0–1)
BILIRUB SERPL-MCNC: 0.34 MG/DL (ref 0.2–1)
BUN SERPL-MCNC: 11 MG/DL (ref 5–25)
CALCIUM SERPL-MCNC: 9.4 MG/DL (ref 8.3–10.1)
CHLORIDE SERPL-SCNC: 105 MMOL/L (ref 100–108)
CO2 SERPL-SCNC: 29 MMOL/L (ref 21–32)
CREAT SERPL-MCNC: 0.81 MG/DL (ref 0.6–1.3)
D DIMER PPP FEU-MCNC: <0.27 UG/ML FEU
EOSINOPHIL # BLD AUTO: 0.3 THOUSAND/ΜL (ref 0–0.61)
EOSINOPHIL NFR BLD AUTO: 3 % (ref 0–6)
ERYTHROCYTE [DISTWIDTH] IN BLOOD BY AUTOMATED COUNT: 12.3 % (ref 11.6–15.1)
GFR SERPL CREATININE-BSD FRML MDRD: 98 ML/MIN/1.73SQ M
GLUCOSE SERPL-MCNC: 84 MG/DL (ref 65–140)
HCT VFR BLD AUTO: 46.5 % (ref 34.8–46.1)
HGB BLD-MCNC: 15.3 G/DL (ref 11.5–15.4)
IMM GRANULOCYTES # BLD AUTO: 0.02 THOUSAND/UL (ref 0–0.2)
IMM GRANULOCYTES NFR BLD AUTO: 0 % (ref 0–2)
INR PPP: 1.08 (ref 0.84–1.19)
LYMPHOCYTES # BLD AUTO: 3.41 THOUSANDS/ΜL (ref 0.6–4.47)
LYMPHOCYTES NFR BLD AUTO: 34 % (ref 14–44)
MAGNESIUM SERPL-MCNC: 2.1 MG/DL (ref 1.6–2.6)
MCH RBC QN AUTO: 29.9 PG (ref 26.8–34.3)
MCHC RBC AUTO-ENTMCNC: 32.9 G/DL (ref 31.4–37.4)
MCV RBC AUTO: 91 FL (ref 82–98)
MONOCYTES # BLD AUTO: 0.39 THOUSAND/ΜL (ref 0.17–1.22)
MONOCYTES NFR BLD AUTO: 4 % (ref 4–12)
NEUTROPHILS # BLD AUTO: 5.75 THOUSANDS/ΜL (ref 1.85–7.62)
NEUTS SEG NFR BLD AUTO: 58 % (ref 43–75)
NRBC BLD AUTO-RTO: 0 /100 WBCS
PLATELET # BLD AUTO: 276 THOUSANDS/UL (ref 149–390)
PMV BLD AUTO: 10.1 FL (ref 8.9–12.7)
POTASSIUM SERPL-SCNC: 3.7 MMOL/L (ref 3.5–5.3)
PROT SERPL-MCNC: 8 G/DL (ref 6.4–8.2)
PROTHROMBIN TIME: 13.4 SECONDS (ref 11.6–14.5)
RBC # BLD AUTO: 5.12 MILLION/UL (ref 3.81–5.12)
SODIUM SERPL-SCNC: 142 MMOL/L (ref 136–145)
TROPONIN I SERPL-MCNC: <0.02 NG/ML
TSH SERPL DL<=0.05 MIU/L-ACNC: 1.64 UIU/ML (ref 0.36–3.74)
WBC # BLD AUTO: 9.93 THOUSAND/UL (ref 4.31–10.16)

## 2020-06-15 PROCEDURE — 85379 FIBRIN DEGRADATION QUANT: CPT | Performed by: EMERGENCY MEDICINE

## 2020-06-15 PROCEDURE — 85610 PROTHROMBIN TIME: CPT | Performed by: EMERGENCY MEDICINE

## 2020-06-15 PROCEDURE — 99285 EMERGENCY DEPT VISIT HI MDM: CPT | Performed by: EMERGENCY MEDICINE

## 2020-06-15 PROCEDURE — 93005 ELECTROCARDIOGRAM TRACING: CPT

## 2020-06-15 PROCEDURE — 84443 ASSAY THYROID STIM HORMONE: CPT | Performed by: EMERGENCY MEDICINE

## 2020-06-15 PROCEDURE — 96361 HYDRATE IV INFUSION ADD-ON: CPT

## 2020-06-15 PROCEDURE — 80053 COMPREHEN METABOLIC PANEL: CPT | Performed by: EMERGENCY MEDICINE

## 2020-06-15 PROCEDURE — 93971 EXTREMITY STUDY: CPT

## 2020-06-15 PROCEDURE — 84484 ASSAY OF TROPONIN QUANT: CPT | Performed by: EMERGENCY MEDICINE

## 2020-06-15 PROCEDURE — 99285 EMERGENCY DEPT VISIT HI MDM: CPT

## 2020-06-15 PROCEDURE — 96360 HYDRATION IV INFUSION INIT: CPT

## 2020-06-15 PROCEDURE — 85730 THROMBOPLASTIN TIME PARTIAL: CPT | Performed by: EMERGENCY MEDICINE

## 2020-06-15 PROCEDURE — 83735 ASSAY OF MAGNESIUM: CPT | Performed by: EMERGENCY MEDICINE

## 2020-06-15 PROCEDURE — 85025 COMPLETE CBC W/AUTO DIFF WBC: CPT | Performed by: EMERGENCY MEDICINE

## 2020-06-15 PROCEDURE — 36415 COLL VENOUS BLD VENIPUNCTURE: CPT | Performed by: EMERGENCY MEDICINE

## 2020-06-15 RX ORDER — AZITHROMYCIN 250 MG/1
TABLET, FILM COATED ORAL
Qty: 6 TABLET | Refills: 0 | Status: SHIPPED | OUTPATIENT
Start: 2020-06-15 | End: 2020-06-19

## 2020-06-15 RX ADMIN — SODIUM CHLORIDE 1000 ML: 0.9 INJECTION, SOLUTION INTRAVENOUS at 21:15

## 2020-06-16 LAB
ATRIAL RATE: 94 BPM
P AXIS: 63 DEGREES
PR INTERVAL: 148 MS
QRS AXIS: 86 DEGREES
QRSD INTERVAL: 84 MS
QT INTERVAL: 322 MS
QTC INTERVAL: 402 MS
T WAVE AXIS: 58 DEGREES
VENTRICULAR RATE: 94 BPM

## 2020-06-16 PROCEDURE — 93010 ELECTROCARDIOGRAM REPORT: CPT | Performed by: INTERNAL MEDICINE

## 2020-06-16 PROCEDURE — 93971 EXTREMITY STUDY: CPT | Performed by: SURGERY

## 2021-09-05 ENCOUNTER — OFFICE VISIT (OUTPATIENT)
Dept: URGENT CARE | Age: 31
End: 2021-09-05
Payer: COMMERCIAL

## 2021-09-05 ENCOUNTER — APPOINTMENT (OUTPATIENT)
Dept: RADIOLOGY | Age: 31
End: 2021-09-05
Payer: COMMERCIAL

## 2021-09-05 VITALS
SYSTOLIC BLOOD PRESSURE: 127 MMHG | RESPIRATION RATE: 18 BRPM | DIASTOLIC BLOOD PRESSURE: 74 MMHG | TEMPERATURE: 98.9 F | HEART RATE: 102 BPM | OXYGEN SATURATION: 99 %

## 2021-09-05 DIAGNOSIS — M54.6 ACUTE LEFT-SIDED THORACIC BACK PAIN: Primary | ICD-10-CM

## 2021-09-05 DIAGNOSIS — N30.00 ACUTE CYSTITIS WITHOUT HEMATURIA: ICD-10-CM

## 2021-09-05 DIAGNOSIS — M54.6 ACUTE LEFT-SIDED THORACIC BACK PAIN: ICD-10-CM

## 2021-09-05 LAB
SL AMB  POCT GLUCOSE, UA: NEGATIVE
SL AMB LEUKOCYTE ESTERASE,UA: NORMAL
SL AMB POCT BILIRUBIN,UA: NEGATIVE
SL AMB POCT BLOOD,UA: NEGATIVE
SL AMB POCT CLARITY,UA: NORMAL
SL AMB POCT COLOR,UA: YELLOW
SL AMB POCT KETONES,UA: NEGATIVE
SL AMB POCT NITRITE,UA: NEGATIVE
SL AMB POCT PH,UA: 8
SL AMB POCT SPECIFIC GRAVITY,UA: 1
SL AMB POCT URINE PROTEIN: NEGATIVE
SL AMB POCT UROBILINOGEN: NEGATIVE

## 2021-09-05 PROCEDURE — 87077 CULTURE AEROBIC IDENTIFY: CPT | Performed by: PHYSICIAN ASSISTANT

## 2021-09-05 PROCEDURE — 87086 URINE CULTURE/COLONY COUNT: CPT | Performed by: PHYSICIAN ASSISTANT

## 2021-09-05 PROCEDURE — 87186 SC STD MICRODIL/AGAR DIL: CPT | Performed by: PHYSICIAN ASSISTANT

## 2021-09-05 PROCEDURE — 71046 X-RAY EXAM CHEST 2 VIEWS: CPT

## 2021-09-05 PROCEDURE — 99213 OFFICE O/P EST LOW 20 MIN: CPT | Performed by: PHYSICIAN ASSISTANT

## 2021-09-05 PROCEDURE — 81002 URINALYSIS NONAUTO W/O SCOPE: CPT | Performed by: PHYSICIAN ASSISTANT

## 2021-09-05 RX ORDER — CYCLOBENZAPRINE HCL 10 MG
10 TABLET ORAL
Qty: 14 TABLET | Refills: 0 | Status: SHIPPED | OUTPATIENT
Start: 2021-09-05

## 2021-09-05 RX ORDER — CEPHALEXIN 500 MG/1
500 CAPSULE ORAL EVERY 12 HOURS SCHEDULED
Qty: 14 CAPSULE | Refills: 0 | Status: SHIPPED | OUTPATIENT
Start: 2021-09-05 | End: 2021-09-12

## 2021-09-05 RX ORDER — PREDNISONE 20 MG/1
40 TABLET ORAL DAILY
Qty: 10 TABLET | Refills: 0 | Status: SHIPPED | OUTPATIENT
Start: 2021-09-05

## 2021-09-05 NOTE — PATIENT INSTRUCTIONS
Continue to monitor symptoms  If new or worsening symptoms develop, go immediately to Er  Drink plenty of fluids  Follow up with Family Doctor this week  Muscle Strain   WHAT YOU NEED TO KNOW:   A muscle strain is a twist, pull, or tear of a muscle or tendon  A tendon is a strong elastic tissue that connects a muscle to a bone  Signs of a strained muscle include bruising and swelling over the area, pain with movement, and loss of strength  DISCHARGE INSTRUCTIONS:   Return to the emergency department if:   · You suddenly cannot feel or move your injured muscle  Contact your healthcare provider if:   · Your pain and swelling worsen or do not go away  · You have questions or concerns about your condition or care  Medicines:   · NSAIDs  help decrease swelling and pain or fever  This medicine is available with or without a doctor's order  NSAIDs can cause stomach bleeding or kidney problems in certain people  If you take blood thinner medicine, always ask your healthcare provider if NSAIDs are safe for you  Always read the medicine label and follow directions  · Muscle relaxers  help decrease pain and muscle spasms  · Take your medicine as directed  Contact your healthcare provider if you think your medicine is not helping or if you have side effects  Tell him of her if you are allergic to any medicine  Keep a list of the medicines, vitamins, and herbs you take  Include the amounts, and when and why you take them  Bring the list or the pill bottles to follow-up visits  Carry your medicine list with you in case of an emergency  Follow up with your healthcare provider as directed: Your healthcare provider may suggest that you have a follow-up visit before you go back to your usual activity  Write down your questions so you remember to ask them during your visits    Self-care:   · 3 to 7 days after the injury:  Use Rest, Ice, Compression, and Elevation (RICE) to help stop bruising and decrease pain and swelling  ? Rest:  Rest your muscle to allow your injury to heal  When the pain decreases, begin normal, slow movements  For mild and moderate muscle strains, you should rest your muscles for about 2 days  However, if you have a severe muscle strain, you should rest for 10 to 14 days  You may need to use crutches to walk if your muscle strain is in your legs or lower body  ? Ice:  Put an ice pack on the injured area  Put a towel between the ice pack and your skin  Do not put the ice pack directly on your skin  You can use a package of frozen peas instead of an ice pack  ? Compression:  You may need to wrap an elastic bandage around the area to decrease swelling  It should be tight enough for you to feel support  Do not wrap it too tightly  ? Elevation:  Keep the injured muscle raised above your heart if possible  For example if you have a strain of your lower leg muscle, lie down and prop your leg up on pillows  This helps decrease pain and swelling  · 3 to 21 days after the injury:  Start to slowly and regularly exercise your muscle  This will help it heal  If you feel pain, decrease how hard you are exercising  · 1 to 6 weeks after the injury:  Stretch the injured muscle  Hold the stretch for about 30 seconds  Do this 4 times a day  You may stretch the muscle until you feel a slight pull  Stop stretching if you feel pain  · 2 weeks to 6 months after the injury:  The goal of this phase is to return to the activity you were doing before the injury happened, without hurting the muscle again  · 3 weeks to 6 months after the injury:  Keep stretching and strengthening your muscles to avoid injury  Slowly increase the time and distance that you exercise  You may have signs and symptoms of muscle strain 6 months after the injury, even if you do things to help it heal  In this case, you may need surgery on the muscle      © Copyright [employer] 2021 Information is for End User's use only and may not be sold, redistributed or otherwise used for commercial purposes  All illustrations and images included in CareNotes® are the copyrighted property of [employer] D [employer] M [employer] Inc  or Robyn Reinoso  The above information is an  only  It is not intended as medical advice for individual conditions or treatments  Talk to your doctor, nurse or pharmacist before following any medical regimen to see if it is safe and effective for you  Urinary Tract Infection in Women   WHAT YOU NEED TO KNOW:   A urinary tract infection (UTI) is caused by bacteria that get inside your urinary tract  Most bacteria that enter your urinary tract come out when you urinate  If the bacteria stay in your urinary tract, you may get an infection  Your urinary tract includes your kidneys, ureters, bladder, and urethra  Urine is made in your kidneys, and it flows from the ureters to the bladder  Urine leaves the bladder through the urethra  A UTI is more common in your lower urinary tract, which includes your bladder and urethra  DISCHARGE INSTRUCTIONS:   Return to the emergency department if:   · You are urinating very little or not at all  · You have a high fever with shaking chills  · You have side or back pain that gets worse  Call your doctor if:   · You have a fever  · You do not feel better after 2 days of taking antibiotics  · You are vomiting  · You have questions or concerns about your condition or care  Medicines:   · Antibiotics  help fight a bacterial infection  If you have UTIs often (called recurrent UTIs), you may be given antibiotics to take regularly  You will be given directions for when and how to use antibiotics  The goal is to prevent UTIs but not cause antibiotic resistance by using antibiotics too often  · Medicines  may be given to decrease pain and burning when you urinate  They will also help decrease the feeling that you need to urinate often   These medicines will make your urine orange or red  · Take your medicine as directed  Contact your healthcare provider if you think your medicine is not helping or if you have side effects  Tell him or her if you are allergic to any medicine  Keep a list of the medicines, vitamins, and herbs you take  Include the amounts, and when and why you take them  Bring the list or the pill bottles to follow-up visits  Carry your medicine list with you in case of an emergency  Follow up with your healthcare provider as directed:  Write down your questions so you remember to ask them during your visits  Prevent another UTI:   · Empty your bladder often  Urinate and empty your bladder as soon as you feel the need  Do not hold your urine for long periods of time  · Wipe from front to back after you urinate or have a bowel movement  This will help prevent germs from getting into your urinary tract through your urethra  · Drink liquids as directed  Ask how much liquid to drink each day and which liquids are best for you  You may need to drink more liquids than usual to help flush out the bacteria  Do not drink alcohol, caffeine, or citrus juices  These can irritate your bladder and increase your symptoms  Your healthcare provider may recommend cranberry juice to help prevent a UTI  · Urinate after you have sex  This can help flush out bacteria passed during sex  · Do not douche or use feminine deodorants  These can change the chemical balance in your vagina  · Change sanitary pads or tampons often  This will help prevent germs from getting into your urinary tract  · Talk to your healthcare provider about your birth control method  You may need to change your method if it is increasing your risk for UTIs  · Wear cotton underwear and clothes that are loose  Tight pants and nylon underwear can trap moisture and cause bacteria to grow  · Vaginal estrogen may be recommended    This medicine helps prevent UTIs in women who have gone through menopause or are in maria l-menopause  · Do pelvic muscle exercises often  Pelvic muscle exercises may help you start and stop urinating  Strong pelvic muscles may help you empty your bladder easier  Squeeze these muscles tightly for 5 seconds like you are trying to hold back urine  Then relax for 5 seconds  Gradually work up to squeezing for 10 seconds  Do 3 sets of 15 repetitions a day, or as directed  © Copyright [employer] 2021 Information is for End User's use only and may not be sold, redistributed or otherwise used for commercial purposes  All illustrations and images included in CareNotes® are the copyrighted property of [employer] Inc  or [facility] Xiomara Yarbrough   The above information is an  only  It is not intended as medical advice for individual conditions or treatments  Talk to your doctor, nurse or pharmacist before following any medical regimen to see if it is safe and effective for you

## 2021-09-05 NOTE — PROGRESS NOTES
[address]        NAME: Pippa Sosa is a 32 y o  female  : 1990    MRN: [id number]  DATE: 2021  TIME: 1:23 PM    Assessment and Plan   Acute left-sided thoracic back pain [M54 6]  1  Acute left-sided thoracic back pain  POCT urine dip    XR chest pa & lateral    predniSONE 20 mg tablet    cyclobenzaprine (FLEXERIL) 10 mg tablet   2  Acute cystitis without hematuria  cephalexin (KEFLEX) 500 mg capsule     Pt currently on menses    Patient Instructions       Continue to monitor symptoms  If new or worsening symptoms develop, go immediately to Er  Drink plenty of fluids  Follow up with Family Doctor this week  Chief Complaint     Chief Complaint   Patient presents with    Back Pain     b/l lower back pain x 3 days, recent dx  with UTI agust per pt  History of Present Illness       Back Pain  This is a new problem  Episode onset: 3 days ago she developed L sided back pain  Unsure the cause  PMH kidney infx in this area  She had leuks in urine at appt about a month ago but wasnt given any abx bc she wasnt symptomatic  Unsure if that is the cause of this  The problem occurs constantly  The problem has been gradually worsening since onset  Pain location: L flank  The quality of the pain is described as aching (Not sharp, stabbing, or pinching)  The pain does not radiate  The pain is at a severity of 4/10  The pain is the same all the time  Exacerbated by: Deep breaths  Stiffness is present all day  Pertinent negatives include no abdominal pain, dysuria, fever, numbness, pelvic pain or weakness  Review of Systems   Review of Systems   Constitutional: Negative  Negative for chills, fatigue and fever  HENT: Negative  Eyes: Negative  Respiratory: Negative  Cardiovascular: Negative  Gastrointestinal: Negative for abdominal pain, constipation, diarrhea, nausea and vomiting  Endocrine: Negative      Genitourinary: Negative for dysuria, flank pain, frequency, pelvic pain, vaginal discharge and vaginal pain  Musculoskeletal: Positive for back pain  Negative for gait problem, neck pain and neck stiffness  Skin: Negative  Allergic/Immunologic: Negative  Neurological: Negative  Negative for weakness and numbness  Hematological: Negative  Psychiatric/Behavioral: Negative  Current Medications       Current Outpatient Medications:     norgestimate-ethinyl estradiol (ORTHO-CYCLEN) 0 25-35 MG-MCG per tablet, Take 1 tablet by mouth daily, Disp: 84 tablet, Rfl: 1    acetaminophen (TYLENOL) 325 mg tablet, Take 1-2 tablets every 4-6 hours for mild to moderate pain as needed   (Patient not taking: Reported on 10/24/2018 ), Disp: 30 tablet, Rfl: 0    ALBUTEROL IN, Inhale as needed, Disp: , Rfl:     calcium carbonate (TUMS) 500 mg chewable tablet, Chew 2 tablets (1,000 mg total) daily as needed for indigestion or heartburn (Patient not taking: Reported on 9/21/2018 ), Disp: , Rfl: 0    cephalexin (KEFLEX) 500 mg capsule, Take 1 capsule (500 mg total) by mouth every 12 (twelve) hours for 7 days, Disp: 14 capsule, Rfl: 0    cyclobenzaprine (FLEXERIL) 10 mg tablet, Take 1 tablet (10 mg total) by mouth daily at bedtime as needed for muscle spasms, Disp: 14 tablet, Rfl: 0    docusate sodium (COLACE) 100 mg capsule, Take 1 capsule (100 mg total) by mouth daily as needed for constipation (Patient not taking: Reported on 9/21/2018 ), Disp: 10 capsule, Rfl: 0    guaiFENesin (ROBITUSSIN) 100 MG/5ML oral liquid, Take 5-10 mL (100-200 mg total) by mouth every 4 (four) hours as needed for cough (Patient not taking: Reported on 9/5/2021), Disp: 60 mL, Rfl: 0    ibuprofen (MOTRIN) 600 mg tablet, Take 1 tablet (600 mg total) by mouth every 6 (six) hours as needed for moderate pain (Patient not taking: Reported on 9/21/2018 ), Disp: 30 tablet, Rfl: 0    ibuprofen (MOTRIN) 600 mg tablet, Take 1 tablet (600 mg total) by mouth every 6 (six) hours as needed for mild pain (Patient not taking: Reported on 2018 ), Disp: 30 tablet, Rfl: 0    METHADONE HCL PO, Take 50 mg by mouth   (Patient not taking: Reported on 2021), Disp: , Rfl:     predniSONE 20 mg tablet, Take 2 tablets (40 mg total) by mouth daily, Disp: 10 tablet, Rfl: 0    Prenatal Vit-Fe Fumarate-FA (PRENATAL VITAMIN PO), Take by mouth (Patient not taking: Reported on 2021), Disp: , Rfl:     simethicone (MYLICON) 80 mg chewable tablet, Chew 1 tablet (80 mg total) 4 (four) times a day as needed for flatulence (Patient not taking: Reported on 2018 ), Disp: 30 tablet, Rfl: 0    Current Allergies     Allergies as of 2021 - Reviewed 06/15/2020   Allergen Reaction Noted    Hydrocodone Other (See Comments) 2017    Ketorolac Myalgia 2017    Thorazine [chlorpromazine] Seizures 2017    Vicodin [hydrocodone-acetaminophen] Other (See Comments) 2017    Hydroxyzine Nausea Only and Vomiting 2017            The following portions of the patient's history were reviewed and updated as appropriate: allergies, current medications, past family history, past medical history, past social history, past surgical history and problem list      Past Medical History:   Diagnosis Date    Asthma     rescue inhaler prn    Attention deficit disorder     Bipolar disorder ([address]     Epilepsy ([address] )     no seizure since  - ? related to drug use - had EEG    IV drug abuse ([address] ) 2018    MRSA infection     10 years ago 2008    Seizures ([address] )     Spontaneous      Tachycardia     Varicella        Past Surgical History:   Procedure Laterality Date    INDUCED       MULTIPLE TOOTH EXTRACTIONS      [place]  DELIVERY ONLY N/A 2018    Procedure:  SECTION ();   Surgeon: Sylvia Coburn MD;  Location: [facility] ;  Service: Obstetrics       Family History   Problem Relation Age of Onset    Hypertension Mother     Hyperlipidemia Mother    Bhupendra Mistry Hypertension Father     Cancer Father         eye    Hyperlipidemia Father     Vision loss Father     Heart disease Maternal Grandfather         MI    Stroke Paternal Grandfather          Medications have been verified  Objective   /74   Pulse 102   Temp 98 9 °F (37 2 °C)   Resp 18   LMP 09/04/2021   SpO2 99%        Physical Exam     Physical Exam  Vitals and nursing note reviewed  Constitutional:       General: She is not in acute distress  Appearance: Normal appearance  She is well-developed  She is not ill-appearing or diaphoretic  HENT:      Head: Normocephalic and atraumatic  Cardiovascular:      Rate and Rhythm: Normal rate and regular rhythm  Heart sounds: Normal heart sounds  Pulmonary:      Effort: Pulmonary effort is normal  No respiratory distress  Breath sounds: Normal breath sounds  No wheezing, rhonchi or rales  Abdominal:      General: Bowel sounds are normal  There is no distension  Palpations: Abdomen is soft  Tenderness: There is no abdominal tenderness  There is no right CVA tenderness, left CVA tenderness, guarding or rebound  Musculoskeletal:      Comments: FROM thoracic and lumbar spine  No midline point tenderness  Mild TTp to muscles inferior to L scapula  (-)Straight leg raise (-)Sitting root  Normal gait  Skin:     General: Skin is warm  Coloration: Skin is not pale  Findings: No rash  Neurological:      Mental Status: She is alert

## 2021-09-07 LAB — BACTERIA UR CULT: ABNORMAL

## 2022-09-26 NOTE — PLAN OF CARE
BIRTH - VAGINAL/ SECTION     Fetal and maternal status remain reassuring during the birth process Completed     Emotionally satisfying birthing experience for mother/fetus Completed          DISCHARGE PLANNING     Discharge to home or other facility with appropriate resources Progressing        INFECTION - ADULT     Absence or prevention of progression during hospitalization Progressing     Absence of fever/infection during neutropenic period Progressing        Knowledge Deficit     Patient/family/caregiver demonstrates understanding of disease process, treatment plan, medications, and discharge instructions Progressing        PAIN - ADULT     Verbalizes/displays adequate comfort level or baseline comfort level Progressing        Potential for Falls     Patient will remain free of falls Progressing        SAFETY ADULT     Maintain or return to baseline ADL function Progressing     Maintain or return mobility status to optimal level Progressing Nsaids Pregnancy And Lactation Text: These medications are considered safe up to 30 weeks gestation. It is excreted in breast milk.

## 2024-03-28 ENCOUNTER — TELEPHONE (OUTPATIENT)
Age: 34
End: 2024-03-28

## 2024-03-28 NOTE — TELEPHONE ENCOUNTER
[facility] and to see when next appt would be. Information was given to patient and she will call back after checking her schedule    Scribe Attestation (For Scribes USE Only)... Scribe Attestation (For Scribes USE Only).../Attending Attestation (For Attendings USE Only)...

## 2025-05-22 ENCOUNTER — ANNUAL EXAM (OUTPATIENT)
Age: 35
End: 2025-05-22

## 2025-05-22 VITALS
HEIGHT: 58 IN | DIASTOLIC BLOOD PRESSURE: 84 MMHG | WEIGHT: 95 LBS | SYSTOLIC BLOOD PRESSURE: 146 MMHG | BODY MASS INDEX: 19.94 KG/M2

## 2025-05-22 DIAGNOSIS — G89.29 CHRONIC PELVIC PAIN IN FEMALE: Primary | ICD-10-CM

## 2025-05-22 DIAGNOSIS — R10.2 CHRONIC PELVIC PAIN IN FEMALE: Primary | ICD-10-CM

## 2025-05-22 DIAGNOSIS — N94.6 DYSMENORRHEA: ICD-10-CM

## 2025-05-22 RX ORDER — NORELGESTROMIN AND ETHINYL ESTRADIOL 35; 150 UG/MG; UG/MG
1 PATCH TRANSDERMAL WEEKLY
COMMUNITY

## 2025-05-22 NOTE — PROGRESS NOTES
Assessment / Plan    Assessment & Plan  Chronic pelvic pain in female    Orders:    US pelvis complete w transvaginal; Future    Dysmenorrhea  Poorly managed on contraceptive patch and with NSAIDs.    Orders:    US pelvis complete w transvaginal; Future      Advised patient that endometriosis can only be diagnosed definitely surgically with pathology.  Patient interested in dx laparoscopy to r/o endometriosis.  RV with physician after pelvic US completed.    I have spent a total time of 45 minutes in caring for this patient on the day of the visit/encounter including Diagnostic results, Patient and family education, Impressions, Counseling / Coordination of care, Documenting in the medical record, Reviewing/placing orders in the medical record (including tests, medications, and/or procedures), and Obtaining or reviewing history  .      Subjective   PROBLEM VISIT     Kimberlee Dietrich is a 34 y.o. female .  NEW PATIENT PROBLEM  35 yo     Pelvic pain which has been chronic and recurrent for 19 years.  She reports being evaluated multiple times.  Imaging has been done which has been normal.  Pain has become progressive of the years and she desires further evaluation to rule out causes, such as endometriosis.  Before and after her period she experiences pain/cramping which she rates at a 7, mostly on right but also now central, radiates to her back.  Takes aleve, helps somewhat.  She is also using contraceptive patch.  Periods coming twice a month, intervals of every 2-3 wks.  Bleeds 5-10 days, at heaviest changes every 3 hours.  +dyspareunia    2023 CT abd/pelvis normal  10/2022 pelvic US:   1. No pelvic mass, fluid collection or evidence of ovarian torsion.   2. Suspect left ovarian corpus luteum measuring 2.4 cm     3/2028 G/C negative   ASCUS/ negative HPV; G/C negative  Denies hx of STDs.  Sexually active.    Menstrual History:  OB History          4    Para   1    Term   1       0  "   AB   3    Living   1         SAB   1    IAB   2    Ectopic   0    Multiple   0    Live Births   1           Obstetric Comments   Menarche 15                Patient's last menstrual period was 05/21/2025 (exact date).       The following portions of the patient's history were reviewed and updated as appropriate: allergies, current medications, past family history, past medical history, past social history, past surgical history, and problem list.    Review of Systems      Review of Systems   Constitutional:  Negative for chills and fever.   Gastrointestinal:  Negative for constipation, diarrhea, nausea and vomiting.   Genitourinary:  Positive for dyspareunia (\"popping\" sensation, ? a tendon), menstrual problem and pelvic pain. Negative for difficulty urinating, dysuria, frequency, genital sores, urgency, vaginal bleeding, vaginal discharge and vaginal pain.       Objective     Visit Vitals  /84 (BP Location: Left arm, Patient Position: Sitting, Cuff Size: Standard)   Ht 4' 10\" (1.473 m)   Wt 43.1 kg (95 lb)   LMP 05/21/2025 (Exact Date)   BMI 19.86 kg/m²   OB Status Unknown   Smoking Status Every Day   BSA 1.33 m²      *patient agrees to exam without a chaperone present.     /84 (BP Location: Left arm, Patient Position: Sitting, Cuff Size: Standard)   Ht 4' 10\" (1.473 m)   Wt 43.1 kg (95 lb)   LMP 05/21/2025 (Exact Date)   BMI 19.86 kg/m²   Physical Exam  Constitutional:       General: She is not in acute distress.     Appearance: Normal appearance. She is well-developed. She is not ill-appearing or diaphoretic.   HENT:      Head: Normocephalic and atraumatic.     Eyes:      Pupils: Pupils are equal, round, and reactive to light.     Pulmonary:      Effort: Pulmonary effort is normal.   Abdominal:      General: Abdomen is flat.      Palpations: Abdomen is soft.   Genitourinary:     General: Normal vulva.      Exam position: Lithotomy position.      Labia:         Right: No rash, tenderness, lesion " or injury.         Left: No rash, tenderness, lesion or injury.       Comments: *unable to accomplish exam due to patient's anxiety and muscle tension.  She prefers to defer exam and to have ultrasound only at this time.    Skin:     General: Skin is warm and dry.     Neurological:      General: No focal deficit present.      Mental Status: She is alert and oriented to person, place, and time.     Psychiatric:         Mood and Affect: Mood normal.         Behavior: Behavior normal.         Thought Content: Thought content normal.         Judgment: Judgment normal.

## 2025-06-26 ENCOUNTER — HOSPITAL ENCOUNTER (OUTPATIENT)
Dept: ULTRASOUND IMAGING | Facility: HOSPITAL | Age: 35
Discharge: HOME | End: 2025-06-26
Attending: NURSE PRACTITIONER
Payer: COMMERCIAL

## 2025-06-26 DIAGNOSIS — R10.2 CHRONIC PELVIC PAIN IN FEMALE: ICD-10-CM

## 2025-06-26 DIAGNOSIS — G89.29 CHRONIC PELVIC PAIN IN FEMALE: ICD-10-CM

## 2025-06-26 DIAGNOSIS — N94.6 DYSMENORRHEA: ICD-10-CM

## 2025-06-26 PROCEDURE — 76830 TRANSVAGINAL US NON-OB: CPT

## 2025-06-26 PROCEDURE — 76856 US EXAM PELVIC COMPLETE: CPT

## 2025-07-11 ENCOUNTER — OFFICE VISIT (OUTPATIENT)
Age: 35
End: 2025-07-11
Payer: COMMERCIAL

## 2025-07-11 VITALS
BODY MASS INDEX: 19.65 KG/M2 | HEIGHT: 58 IN | SYSTOLIC BLOOD PRESSURE: 128 MMHG | DIASTOLIC BLOOD PRESSURE: 76 MMHG | WEIGHT: 93.6 LBS

## 2025-07-11 DIAGNOSIS — R10.2 PELVIC PAIN: Primary | ICD-10-CM

## 2025-07-11 DIAGNOSIS — Z12.4 CERVICAL CANCER SCREENING: ICD-10-CM

## 2025-07-11 DIAGNOSIS — B18.2 CHRONIC HEPATITIS C WITHOUT HEPATIC COMA (HCC): ICD-10-CM

## 2025-07-11 PROCEDURE — G0145 SCR C/V CYTO,THINLAYER,RESCR: HCPCS | Performed by: OBSTETRICS & GYNECOLOGY

## 2025-07-11 PROCEDURE — 99213 OFFICE O/P EST LOW 20 MIN: CPT | Performed by: OBSTETRICS & GYNECOLOGY

## 2025-07-11 PROCEDURE — G0476 HPV COMBO ASSAY CA SCREEN: HCPCS | Performed by: OBSTETRICS & GYNECOLOGY

## 2025-07-11 RX ORDER — SODIUM CHLORIDE, SODIUM LACTATE, POTASSIUM CHLORIDE, CALCIUM CHLORIDE 600; 310; 30; 20 MG/100ML; MG/100ML; MG/100ML; MG/100ML
20 INJECTION, SOLUTION INTRAVENOUS CONTINUOUS
OUTPATIENT
Start: 2025-07-11

## 2025-07-11 NOTE — PROGRESS NOTES
"Subjective    Patient is a 33 yo  who presents today to discuss chronic pelvic pain. She has had this pain for many years, since menarche, and it has become progressively worse. She experiences this pain before and after periods. Taking Aleve helps somewhat with the pain. She is currently using the Ortho Evra patch; her periods are somewhat abnormal, q2-3 weeks. She also experiences dysparuenia.    At the time of her  section in 2018, normal uterus, fallopian tubes, and ovaries were noted. A recent pelvic ultrasound on 25 was normal.    OB History          4    Para   1    Term   1       0    AB   3    Living   1         SAB   1    IAB   2    Ectopic   0    Multiple   0    Live Births   1           Obstetric Comments   Menarche 15                      Objective    Vitals:    25 1332   BP: 128/76   BP Location: Left arm   Patient Position: Sitting   Cuff Size: Adult   Weight: 42.5 kg (93 lb 9.6 oz)   Height: 4' 10\" (1.473 m)        Physical Exam  Constitutional:       Appearance: She is well-developed.   Genitourinary:      Vulva normal.      No vaginal discharge.     Cardiovascular:      Rate and Rhythm: Normal rate.   Pulmonary:      Effort: Pulmonary effort is normal. No respiratory distress.   Abdominal:      Palpations: Abdomen is soft.      Tenderness: There is no abdominal tenderness.     Skin:     General: Skin is warm and dry.          Assessment    Problem List[1]     Plan  - Needs pap smear, collected  - reviewed possible causes of pelvic pain, including pelvic floor dysfunction, endometriosis, adenomyosis  - Patient has previously tried OCPs, Depo, and Ortho Evra for her symptoms without relief; she declines an IUD  - Discussed a diagnostic laparoscopy to help determine if endometriosis is contributing to her pain; reviewed risks of surgery, including bleeding, infection, injury to surrounding structures; consent signed today for diagnostic laparoscopy, exam under " anesthesia, all other indicated procedures  - Patient will return for pre-op appointment  - Referral for pelvic PT provided         [1]   Patient Active Problem List  Diagnosis    Anxiety and depression    Asthma    Asthma, exercise induced    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

## 2025-07-14 LAB
HPV HR 12 DNA CVX QL NAA+PROBE: NEGATIVE
HPV16 DNA CVX QL NAA+PROBE: NEGATIVE
HPV18 DNA CVX QL NAA+PROBE: NEGATIVE

## 2025-07-16 LAB
LAB AP GYN PRIMARY INTERPRETATION: NORMAL
Lab: NORMAL
PATH INTERP SPEC-IMP: NORMAL

## 2025-07-28 ENCOUNTER — TELEPHONE (OUTPATIENT)
Dept: OBGYN CLINIC | Facility: CLINIC | Age: 35
End: 2025-07-28

## 2025-08-18 DIAGNOSIS — Z30.016 ENCOUNTER FOR INITIAL PRESCRIPTION OF TRANSDERMAL PATCH HORMONAL CONTRACEPTIVE DEVICE: Primary | ICD-10-CM

## 2025-08-18 RX ORDER — NORELGESTROMIN AND ETHINYL ESTRADIOL 35; 150 UG/MG; UG/MG
1 PATCH TRANSDERMAL WEEKLY
Qty: 36 PATCH | Refills: 3 | Status: SHIPPED | OUTPATIENT
Start: 2025-08-18

## (undated) DEVICE — Device

## (undated) DEVICE — TELFA NON-ADHERENT ABSORBENT DRESSING: Brand: TELFA

## (undated) DEVICE — ADHESIVE SKN CLSR HISTOACRYL FLEX 0.5ML LF

## (undated) DEVICE — GLOVE SRG BIOGEL 7

## (undated) DEVICE — GLOVE SRG BIOGEL 7.5

## (undated) DEVICE — SKIN MARKER DUAL TIP WITH RULER CAP, FLEXIBLE RULER AND LABELS: Brand: DEVON

## (undated) DEVICE — SUT VICRYL 0 CT-1 27 IN J260H

## (undated) DEVICE — ABDOMINAL PAD: Brand: DERMACEA

## (undated) DEVICE — PACK C-SECTION PBDS

## (undated) DEVICE — GAUZE SPONGES,16 PLY: Brand: CURITY

## (undated) DEVICE — SUT MONOCRYL 4-0 PS-2 27 IN Y426H

## (undated) DEVICE — DRESSING GUAZE ADH BORDER 4 X 4 IN

## (undated) DEVICE — CHLORAPREP HI-LITE 26ML ORANGE

## (undated) DEVICE — PROXIMATE SKIN STAPLERS (35 WIDE) CONTAINS 35 STAINLESS STEEL STAPLES (FIXED HEAD): Brand: PROXIMATE

## (undated) DEVICE — SUT VICRYL 0 CTX 36 IN J978H